# Patient Record
Sex: MALE | Race: WHITE | NOT HISPANIC OR LATINO | Employment: OTHER | URBAN - NONMETROPOLITAN AREA
[De-identification: names, ages, dates, MRNs, and addresses within clinical notes are randomized per-mention and may not be internally consistent; named-entity substitution may affect disease eponyms.]

---

## 2017-02-10 ENCOUNTER — HOSPITAL ENCOUNTER (INPATIENT)
Facility: HOSPITAL | Age: 77
LOS: 13 days | Discharge: HOME | End: 2017-02-23
Attending: PSYCHIATRY & NEUROLOGY | Admitting: PSYCHIATRY & NEUROLOGY

## 2017-02-10 PROBLEM — F03.918 DEMENTIA WITH BEHAVIORAL DISTURBANCE (HCC): Status: ACTIVE | Noted: 2017-02-10

## 2017-02-10 RX ORDER — IBUPROFEN 600 MG/1
600 TABLET ORAL 4 TIMES DAILY
COMMUNITY
End: 2017-03-09 | Stop reason: HOSPADM

## 2017-02-10 RX ORDER — DONEPEZIL HYDROCHLORIDE 5 MG/1
5 TABLET, FILM COATED ORAL NIGHTLY
COMMUNITY
End: 2017-02-23 | Stop reason: HOSPADM

## 2017-02-10 RX ORDER — HYDROXYZINE PAMOATE 50 MG/1
50 CAPSULE ORAL EVERY 6 HOURS PRN
Status: DISCONTINUED | OUTPATIENT
Start: 2017-02-10 | End: 2017-02-23 | Stop reason: HOSPADM

## 2017-02-10 RX ORDER — DONEPEZIL HYDROCHLORIDE 5 MG/1
5 TABLET, FILM COATED ORAL NIGHTLY
Status: DISCONTINUED | OUTPATIENT
Start: 2017-02-11 | End: 2017-02-19

## 2017-02-10 RX ORDER — MEMANTINE HYDROCHLORIDE 5 MG/1
5 TABLET ORAL 2 TIMES DAILY
Status: ON HOLD | COMMUNITY
End: 2017-03-09

## 2017-02-10 RX ORDER — TRAZODONE HYDROCHLORIDE 50 MG/1
50 TABLET ORAL NIGHTLY PRN
Status: DISCONTINUED | OUTPATIENT
Start: 2017-02-10 | End: 2017-02-19

## 2017-02-10 RX ORDER — LOPERAMIDE HYDROCHLORIDE 2 MG/1
2 CAPSULE ORAL 4 TIMES DAILY PRN
Status: DISCONTINUED | OUTPATIENT
Start: 2017-02-10 | End: 2017-02-23 | Stop reason: HOSPADM

## 2017-02-10 RX ORDER — DOCUSATE SODIUM 100 MG/1
100 CAPSULE, LIQUID FILLED ORAL 2 TIMES DAILY PRN
Status: DISCONTINUED | OUTPATIENT
Start: 2017-02-10 | End: 2017-02-23 | Stop reason: HOSPADM

## 2017-02-10 RX ORDER — MEMANTINE HYDROCHLORIDE 5 MG/1
5 TABLET ORAL 2 TIMES DAILY
Status: DISCONTINUED | OUTPATIENT
Start: 2017-02-11 | End: 2017-02-23 | Stop reason: HOSPADM

## 2017-02-10 RX ORDER — MAGNESIUM HYDROXIDE/ALUMINUM HYDROXICE/SIMETHICONE 120; 1200; 1200 MG/30ML; MG/30ML; MG/30ML
30 SUSPENSION ORAL EVERY 6 HOURS PRN
Status: DISCONTINUED | OUTPATIENT
Start: 2017-02-10 | End: 2017-02-23 | Stop reason: HOSPADM

## 2017-02-10 RX ORDER — ACETAMINOPHEN 325 MG/1
650 TABLET ORAL EVERY 4 HOURS PRN
Status: DISCONTINUED | OUTPATIENT
Start: 2017-02-10 | End: 2017-02-23 | Stop reason: HOSPADM

## 2017-02-10 RX ORDER — CLONIDINE HYDROCHLORIDE 0.1 MG/1
0.1 TABLET ORAL EVERY 4 HOURS PRN
Status: DISCONTINUED | OUTPATIENT
Start: 2017-02-10 | End: 2017-02-23 | Stop reason: HOSPADM

## 2017-02-10 RX ORDER — TRAZODONE HYDROCHLORIDE 50 MG/1
25 TABLET ORAL NIGHTLY
COMMUNITY
End: 2017-02-23 | Stop reason: HOSPADM

## 2017-02-11 PROBLEM — F06.34 BIPOLAR AND RELATED DISORDER DUE TO ANOTHER MEDICAL CONDITION WITH MIXED FEATURES: Status: ACTIVE | Noted: 2017-02-11

## 2017-02-11 PROBLEM — F03.91 MAJOR NEUROCOGNITIVE DISORDER DUE TO ALZHEIMER'S DISEASE, PROBABLE, WITH BEHAVIORAL DISTURBANCE: Status: ACTIVE | Noted: 2017-02-10

## 2017-02-11 PROCEDURE — 90791 PSYCH DIAGNOSTIC EVALUATION: CPT | Performed by: PSYCHIATRY & NEUROLOGY

## 2017-02-11 PROCEDURE — 99221 1ST HOSP IP/OBS SF/LOW 40: CPT | Performed by: FAMILY MEDICINE

## 2017-02-11 RX ORDER — NICOTINE 21 MG/24HR
1 PATCH, TRANSDERMAL 24 HOURS TRANSDERMAL EVERY 24 HOURS
Status: DISCONTINUED | OUTPATIENT
Start: 2017-02-11 | End: 2017-02-23 | Stop reason: HOSPADM

## 2017-02-11 RX ORDER — DIVALPROEX SODIUM 500 MG/1
500 TABLET, EXTENDED RELEASE ORAL NIGHTLY
Status: DISCONTINUED | OUTPATIENT
Start: 2017-02-11 | End: 2017-02-12

## 2017-02-11 RX ADMIN — TRAZODONE HYDROCHLORIDE 50 MG: 50 TABLET ORAL at 21:17

## 2017-02-11 RX ADMIN — NICOTINE 1 PATCH: 14 PATCH, EXTENDED RELEASE TRANSDERMAL at 11:28

## 2017-02-11 RX ADMIN — DIVALPROEX SODIUM 500 MG: 500 TABLET, FILM COATED, EXTENDED RELEASE ORAL at 21:17

## 2017-02-11 RX ADMIN — DONEPEZIL HYDROCHLORIDE 5 MG: 5 TABLET, FILM COATED ORAL at 21:17

## 2017-02-11 RX ADMIN — MEMANTINE 5 MG: 5 TABLET ORAL at 17:32

## 2017-02-11 RX ADMIN — MEMANTINE 5 MG: 5 TABLET ORAL at 09:28

## 2017-02-11 NOTE — PLAN OF CARE
Problem: BH Overarching Goals  Goal: Adheres to Safety Considerations for Self and Others  Intervention: Develop/maintain Individualized Safety Plan    02/11/17 0018   Safety   Safety Measures belongings check completed;clinical history reviewed;environmental rounds completed;safety rounds completed;suicide assessment completed   Provide Emotional/Physical Safety   Suicidal Ideation no

## 2017-02-11 NOTE — NURSING NOTE
Behavior   Anxiety 0  Depression 0  Pain 0  AVH no  S/I no  H/I no     Pt up sitting in wheelchair in sarita dayroom area, pt quiet, watching TV. Pt unable to rate anxiety, depression or participate much in assessment related to confusion. Pt talks loud and is gruff with staff when asking questions. Pt was cooperative with taking morning medications. Pt is alert to self only.           Intervention  Instructed in med usage and effects, encouraged to verbalize needs. Meds administered as ordered.  Attempts made to reorient pt as needed.        Response  Verbalized understanding but is very forgetful and confused.           Plan  Continue to monitor for safety/  every 15 minute monitoring checks.Staff to be in sarita dayroom area to assist as needed.

## 2017-02-11 NOTE — NURSING NOTE
"Behavior   Anxiety 0  Depression 0  Pain 0  AVH no  S/I no  H/I no    Pt has been up in [facility] dayroom area, except for napping. Pt is confused, alert to self only, pt could not recall name of his wife or if he was  when told his family was coming for a visit. Pt yells ,\"hey somebody\" when he needs help but is easily calmed and redirected. Pt allows staff to assist him with personal hygiene and ADLs. Pt is compliant with scheduled medications.        Intervention  Instructed in med usage and effects, encouraged to verbalize needs. Meds administered as ordered.          Response  Pt confused, reoriented as needed. Pt easy to redirect. No aggression noted.          Plan  Continue to monitor for safety/  every 15 minute monitoring checks.    "

## 2017-02-11 NOTE — NURSING NOTE
Dr Villeda ROS         General  NONE    Eyes   glasses/contact lens    ENT/Mouth   None    Cardio   None    Resp   Coughing    GI    None       None    MS    Stiffness    Skin/Hair/Nails   None    Neuro   Numbness or tingling left hand

## 2017-02-11 NOTE — CONSULTS
"Adult Nutrition  Assessment    Patient Name:  Naresh Saunders  YOB: 1940  MRN: [id number]  Admit Date:  2/10/2017    Assessment Date:  2/11/2017          Reason for Assessment       02/11/17 1224    Reason for Assessment    Reason For Assessment/Visit admission assessment    Identified At Risk By Screening Criteria MST SCORE 2+                    Labs/Tests/Procedures/Meds       02/11/17 1225    Labs/Tests/Procedures/Meds    Medication Review Reviewed, pertinent              Estimated/Assessed Needs       02/11/17 1225    Calculation Measurements    Weight Used For Calculations 70 kg (154 lb 5.2 oz)    Height Used for Calculations 1.727 m (5' 8\")    Estimated/Assessed Energy Needs    Energy Need Method Kcal/kg    kcal/kg 25    25 Kcal/Kg (kcal) 1750    Estimated/Assessed Protein Needs    Weight Used for Protein Calculation 70 kg (154 lb 5.2 oz)    Protein (gm/kg) 0.8    0.8 Gm Protein (gm) 56            Nutrition Prescription Ordered       02/11/17 1226    Nutrition Prescription PO    Current PO Diet Regular            Evaluation of Received Nutrient/Fluid Intake       02/11/17 1226    PO Evaluation    Number of Days PO Intake Evaluated 1 day    Number of Meals 1    % PO Intake 50            Comments:  RD consult for MST score 4. RN indicates pt is eating well and will probably take a supplement. Unsure of wt hx. RD will add supplement and monitor acceptance.         Electronically signed by:  Dottie Loving RD  02/11/17 12:31 PM  "
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
## 2017-02-11 NOTE — CONSULTS
CHIEF COMPLAINT/REASON FOR VISIT:  Suicidal Ideation and Agitation    HPI:  Patient presented to [facility] ED with the above complaint on February 10th at 11:50 AM.  He was brought in by family because he was threatening to harm himself and some family members.  When he got to the emergency room he denied this.    PROBLEM LIST:  Patient Active Problem List    Diagnosis   • Dementia with behavioral disturbance [F03.91]         CURRENT MEDICATIONS:  Prescriptions Prior to Admission   Medication Sig Dispense Refill Last Dose   • donepezil (ARICEPT) 5 MG tablet Take 5 mg by mouth Every Night.      • ibuprofen (ADVIL,MOTRIN) 600 MG tablet Take 600 mg by mouth 4 (Four) Times a Day.      • memantine (NAMENDA) 5 MG tablet Take 5 mg by mouth 2 (Two) Times a Day.      • traZODone (DESYREL) 50 MG tablet Take 25 mg by mouth Every Night.          ALLERGIES:  Penicillins      PAST MEDICAL/SURGICAL HISTORY:  History reviewed. No pertinent past medical history.    Past Surgical History   Procedure Laterality Date   • Tonsillectomy     • Adenoidectomy Bilateral        Review of Systems   Constitutional: Negative for activity change, appetite change, fatigue and fever.        The patient does only respond yes to 1 system, but it is difficult for him to attend to the questions.   HENT: Negative for congestion, ear discharge, ear pain, facial swelling, hearing loss, nosebleeds, postnasal drip, rhinorrhea, sinus pressure, sore throat, tinnitus and trouble swallowing.    Eyes: Negative for pain, discharge and visual disturbance.   Respiratory: Negative for cough, shortness of breath and wheezing.    Cardiovascular: Negative for chest pain, palpitations and leg swelling.   Gastrointestinal: Negative for abdominal pain, blood in stool, constipation, diarrhea, nausea and vomiting.   Genitourinary: Negative for difficulty urinating, discharge, dysuria, flank pain, frequency, hematuria, penile pain, penile swelling, scrotal swelling,  "testicular pain and urgency.   Musculoskeletal: Negative for arthralgias, back pain, joint swelling, myalgias and neck pain.   Skin: Negative for rash and wound.   Neurological: Positive for numbness. Negative for dizziness, seizures, syncope, weakness, light-headedness and headaches.        Patient reports intermittent numbness and tingling of the left hand.   Hematological: Negative for adenopathy.       Social History     Social History   • Marital status:      Spouse name: N/A   • Number of children: N/A   • Years of education: N/A     Occupational History   • Not on file.     Social History Main Topics   • Smoking status: Heavy Tobacco Smoker     Packs/day: 1.50     Years: 50.00   • Smokeless tobacco: Not on file      Comment: patient has been smoking for more than 50 years; states 1 or more per day   • Alcohol use 1.8 oz/week     3 Cans of beer per week      Comment: 3-4 times per week   • Drug use: No   • Sexual activity: Yes     Partners: Female      Comment: \"lady friend\" Italia Flores     Other Topics Concern   • Not on file     Social History Narrative       History reviewed. No pertinent family history.          Objective     Visit Vitals   • /70 (BP Location: Left arm, Patient Position: Sitting)   • Pulse 67   • Temp 97.4 °F (36.3 °C) (Oral)   • Resp 20   • Ht 68\" (172.7 cm)   • Wt 134 lb 14.7 oz (61.2 kg)   • SpO2 95%   • BMI 20.51 kg/m2       Physical Exam   Constitutional: He appears well-developed and well-nourished.   HENT:   Head: Normocephalic and atraumatic.   Eyes: Conjunctivae and EOM are normal.   Neck: Normal range of motion. Neck supple. No thyromegaly present.   Cardiovascular: Normal rate, regular rhythm and normal heart sounds.  Exam reveals no gallop and no friction rub.    No murmur heard.  Pulmonary/Chest: Effort normal and breath sounds normal. No respiratory distress. He has no wheezes. He has no rales.   Abdominal: Soft. He exhibits no distension and no mass. There is " no tenderness. There is no rebound and no guarding.   Musculoskeletal: Normal range of motion.   Lymphadenopathy:     He has no cervical adenopathy.   Neurological: He is alert. He has normal strength. He displays no tremor and normal reflexes. A cranial nerve deficit is present. No sensory deficit. He exhibits normal muscle tone. Coordination normal. He displays no Babinski's sign on the right side. He displays no Babinski's sign on the left side.   Reflex Scores:       Tricep reflexes are 3+ on the right side and 3+ on the left side.       Bicep reflexes are 3+ on the right side and 3+ on the left side.       Brachioradialis reflexes are 3+ on the right side and 3+ on the left side.       Patellar reflexes are 3+ on the right side and 3+ on the left side.       Achilles reflexes are 3+ on the right side and 3+ on the left side.  Patient is unable to identify an alcohol prep by smell.  All other cranial nerves are intact, although cooperation makes detailed testing difficult.   Skin: Skin is warm and dry. No rash noted. No erythema.   Nursing note and vitals reviewed.      Dystonia/Tardive Dyskinesia  Absent  Meningeal Signs  Absent    Diagnostic Studies  CBC, CMP,TSH, UDS, acetaminophen level, salicylate level, ethanol level, U/A all normal except     MCHC is slightly low at 33 but H&H is normal at 14.6 over 44.2.  Creatinine is 1.08, sodium slightly low at 132 and alkaline phosphatase slightly elevated at 155.  Ethanol is less than 10 troponin is less than 0.012, urine drug screen is all negative and urinalysis is unremarkable.    Non-contrasted CT of the head shows moderate cortical atrophy with ventricular dilatation without acute change.  Chest x-ray shows mild interstitial prominence in the mid and lower lungs bilaterally.  There is no focal infiltrate.    Assessment/Plan     Patient Active Problem List    Diagnosis   • Dementia with behavioral disturbance [F03.91]     Mild hyponatremia. plan will be to  encourage normal diet and fluid intake. this is unlikely to explain his behavior.    Interstitial prominence on the chest x-ray is unlikely to be pneumonia given the rest of the clinical presentation.  Again this is unlikely to contribute to his current behavioral issues.  Will monitor during this hospitalization.      Continue Home Meds as ordered. Mental health and pain issues managed per psychiatry.  Further diagnostic studies or intervention based on hospital course.
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
## 2017-02-11 NOTE — PLAN OF CARE
Problem: BH Patient Care Overview (Adult)  Goal: Plan of Care Review  Outcome: Ongoing (interventions implemented as appropriate)  Goal: Interdisciplinary Rounds/Family Conference  Outcome: Ongoing (interventions implemented as appropriate)  Goal: Individualization and Mutuality  Outcome: Ongoing (interventions implemented as appropriate)  Goal: Discharge Needs Assessment  Outcome: Ongoing (interventions implemented as appropriate)    Problem:  Overarching Goals  Goal: Adheres to Safety Considerations for Self and Others  Outcome: Ongoing (interventions implemented as appropriate)  Goal: Optimized Coping Skills in Response to Life Stressors  Outcome: Ongoing (interventions implemented as appropriate)  Goal: Develops/Participates in Therapeutic [place] to Support Successful Transition  Outcome: Ongoing (interventions implemented as appropriate)

## 2017-02-12 PROCEDURE — 99232 SBSQ HOSP IP/OBS MODERATE 35: CPT | Performed by: PSYCHIATRY & NEUROLOGY

## 2017-02-12 RX ADMIN — TRAZODONE HYDROCHLORIDE 50 MG: 50 TABLET ORAL at 21:12

## 2017-02-12 RX ADMIN — MEMANTINE 5 MG: 5 TABLET ORAL at 17:42

## 2017-02-12 RX ADMIN — NICOTINE 1 PATCH: 14 PATCH, EXTENDED RELEASE TRANSDERMAL at 08:02

## 2017-02-12 RX ADMIN — DIVALPROEX SODIUM 750 MG: 500 TABLET, FILM COATED, EXTENDED RELEASE ORAL at 21:12

## 2017-02-12 RX ADMIN — DONEPEZIL HYDROCHLORIDE 5 MG: 5 TABLET, FILM COATED ORAL at 21:12

## 2017-02-12 RX ADMIN — HYDROXYZINE PAMOATE 50 MG: 50 CAPSULE ORAL at 21:12

## 2017-02-12 RX ADMIN — MEMANTINE 5 MG: 5 TABLET ORAL at 08:01

## 2017-02-12 NOTE — NURSING NOTE
Behavior   Anxiety 0  Depression 0  Pain 0  AVH no  S/I no  H/I no     Pt. In common area upon assessment and medication pass.  Pt is disoriented but pleasant; is alert to person.  Pt interacts with staff and others appropriately.  No changes from last assessment.    Intervention  Medications administered and assessment complete    Response  Pt took medications and had snack    Plan  Will promote and reinforce current treatment plan and encourage involvement in care plan goals. Will provide for safe, calm, quiet environment. Will promote open communication with staff and foster a trusting/working relationship with patient. Will promote participation in groups and therapies and independent reflection.

## 2017-02-12 NOTE — PLAN OF CARE
Problem: BH Patient Care Overview (Adult)  Goal: Plan of Care Review  Outcome: Ongoing (interventions implemented as appropriate)  Goal: Interdisciplinary Rounds/Family Conference  Outcome: Ongoing (interventions implemented as appropriate)  Goal: Individualization and Mutuality  Outcome: Ongoing (interventions implemented as appropriate)  Goal: Discharge Needs Assessment  Outcome: Ongoing (interventions implemented as appropriate)    Problem:  Overarching Goals  Goal: Adheres to Safety Considerations for Self and Others  Outcome: Ongoing (interventions implemented as appropriate)  Goal: Optimized Coping Skills in Response to Life Stressors  Outcome: Ongoing (interventions implemented as appropriate)  Goal: Develops/Participates in Therapeutic [place] to Support Successful Transition  Outcome: Ongoing (interventions implemented as appropriate)

## 2017-02-12 NOTE — PROGRESS NOTES
2/12/2017    Chief Complaint: dementia related behavioral issues    Subjective:  Patient is a 76 y.o. male who was hospitalized for dementia related behavioral issues.   Since yesterday the patient has been confused but pleasant and cooperative.  He had very choppy interrupted sleep.  Patient's medications are tolerable and effective.    Objective     Vital Signs    Temp:  [97.1 °F (36.2 °C)-97.2 °F (36.2 °C)] 97.1 °F (36.2 °C)  Heart Rate:  [69-70] 70  Resp:  [18-20] 18  BP: (130-133)/(65-69) 133/69    Physical Exam:   General Appearance: alert, appears stated age and cooperative,  Hygiene:   fair  Gait & Station: Normal  Musculoskeletal: No tremors or abnormal involuntary movements}    Mental Status Exam:   Cooperation:  Cooperative  Eye Contact:  Fair  Psychomotor Behavior:  Restless  Mood: confused  Affect:  mood-congruent  Speech:  Normal  Thought Process:  Poverty of thought and [place]  Associations: Goal Directed  Thought Content:     Normal   Suicidal:  None   Homicidal:  None   Hallucinations:  None   Delusion:  None  Cognitive Functioning:   Consciousness: awake and alert and Orientation:  Person  Reliability:  poor  Insight:  Poor  Judgement:  Poor  Impulse Control:  Poor    Lab Results (last 24 hours)     Procedure Component Value Units Date/Time    SCANNED - LABS [[id number]] Resulted:  02/10/17      Updated:  02/12/17 0625        Imaging Results (last 24 hours)     Procedure Component Value Units Date/Time    SCANNED - IMAGING [[id number]] Resulted:  02/10/17      Updated:  02/12/17 0614    SCANNED - IMAGING [[id number]] Resulted:  02/10/17      Updated:  02/12/17 0614          Medicine:   Current Facility-Administered Medications:   •  acetaminophen (TYLENOL) tablet 650 mg, 650 mg, Oral, Q4H PRN, Fahad Camara MD  •  aluminum-magnesium hydroxide-simethicone (MAALOX/MYLANTA) suspension 30 mL, 30 mL, Oral, Q6H PRN, Fahad Camara MD  •  CloNIDine (CATAPRES) tablet 0.1 mg, 0.1 mg, Oral, Q4H PRN,  Fahad Camara MD  •  divalproex (DEPAKOTE) 24 hr tablet 500 mg, 500 mg, Oral, Nightly, Fahad Camara MD, 500 mg at 02/11/17 2117  •  docusate sodium (COLACE) capsule 100 mg, 100 mg, Oral, BID PRN, Fahad Camara MD  •  donepezil (ARICEPT) tablet 5 mg, 5 mg, Oral, Nightly, Fahad Camara MD, 5 mg at 02/11/17 2117  •  hydrOXYzine (VISTARIL) capsule 50 mg, 50 mg, Oral, Q6H PRN, Fahad Camara MD  •  loperamide (IMODIUM) capsule 2 mg, 2 mg, Oral, 4x Daily PRN, Fahad Camara MD  •  magnesium hydroxide (MILK OF MAGNESIA) suspension 2400 mg/10mL 10 mL, 10 mL, Oral, Daily PRN, Fahad Camara MD  •  memantine (NAMENDA) tablet 5 mg, 5 mg, Oral, BID, Fahad Camara MD, 5 mg at 02/12/17 0801  •  nicotine (NICODERM CQ) 14 MG/24HR patch 1 patch, 1 patch, Transdermal, Q24H, Fahad Camara MD, 1 patch at 02/12/17 0802  •  traZODone (DESYREL) tablet 50 mg, 50 mg, Oral, Nightly PRN, Fahad Camara MD, 50 mg at 02/11/17 2117    Diagnoses/Assessment:   Active Problems:    Major neurocognitive disorder due to Alzheimer's disease, probable, with behavioral disturbance    Bipolar and related disorder due to another medical condition with mixed features      Treatment Plan:    1) Will continue care for the patient on the behavioral health unit at [facility] to ensure patient safety.  2) Will continue to provide treatment with the unit milieu, activities, therapies and psychopharmacological management.  3) Patient to be placed on or continued on  Q15 minute checks  and Elopement, Aggression and Fall precautions.  4) Will continue medical management by Dr. Villeda.  5) Will order following labs: none  6) Will make the following medication changes: Increase the depakote to 750mg qhs.  Cont the aricept and namenda  7) Will continue discharge planning as appropriate for patient.  8) Psychotherapy provided: none      Fahad Camara MD  02/12/17  2:31 PM

## 2017-02-12 NOTE — NURSING NOTE
Behavior   Anxiety 0  Depression 0  Pain 0  AVH no  S/I no  H/I no     Pt unable to rate anxiety, depression or participate fully in assessment related to confusion. Pt has to be redirected and reoriented at times but remains calm and pleasant.            Intervention  Instructed in med usage and effects, encouraged to verbalize needs. Meds administered as ordered.          Response  Verbalized understanding           Plan  Continue to monitor for safety/  every 15 minute monitoring checks.

## 2017-02-13 PROCEDURE — 99232 SBSQ HOSP IP/OBS MODERATE 35: CPT | Performed by: NURSE PRACTITIONER

## 2017-02-13 RX ADMIN — DIVALPROEX SODIUM 750 MG: 500 TABLET, FILM COATED, EXTENDED RELEASE ORAL at 20:38

## 2017-02-13 RX ADMIN — MEMANTINE 5 MG: 5 TABLET ORAL at 17:03

## 2017-02-13 RX ADMIN — DONEPEZIL HYDROCHLORIDE 5 MG: 5 TABLET, FILM COATED ORAL at 20:38

## 2017-02-13 RX ADMIN — MEMANTINE 5 MG: 5 TABLET ORAL at 08:11
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
## 2017-02-13 NOTE — PLAN OF CARE
Problem: BH Patient Care Overview (Adult)  Goal: Plan of Care Review  Outcome: Ongoing (interventions implemented as appropriate)  Goal: Interdisciplinary Rounds/Family Conference  Outcome: Ongoing (interventions implemented as appropriate)    02/13/17 0999   Interdisciplinary Rounds/Family Conf   Participants advanced practice nurse;therapeutic recreation;social work;nursing;other (see comments)       Goal: Individualization and Mutuality  Outcome: Ongoing (interventions implemented as appropriate)  Goal: Discharge Needs Assessment  Outcome: Ongoing (interventions implemented as appropriate)

## 2017-02-13 NOTE — NURSING NOTE
Behavior   Anxiety 0  Depression 0  Pain 0  AVH no  S/I no   H/I no            Intervention  Instructed in med usage and effects, encouraged to verbalize needs. Meds administered as ordered. Monitored throughout shift          Response  Verbalized understanding           Plan  Continue to monitor for safety/  every 15 minute monitoring checks. Assist to meet treatment goals.

## 2017-02-13 NOTE — PLAN OF CARE
Problem: BH Patient Care Overview (Adult)  Goal: Plan of Care Review  Outcome: Ongoing (interventions implemented as appropriate)  Goal: Interdisciplinary Rounds/Family Conference  Outcome: Ongoing (interventions implemented as appropriate)  Goal: Individualization and Mutuality  Outcome: Ongoing (interventions implemented as appropriate)  Goal: Discharge Needs Assessment  Outcome: Ongoing (interventions implemented as appropriate)    Problem:  Overarching Goals  Goal: Adheres to Safety Considerations for Self and Others  Outcome: Ongoing (interventions implemented as appropriate)  Goal: Optimized Coping Skills in Response to Life Stressors  Outcome: Ongoing (interventions implemented as appropriate)  Goal: Develops/Participates in Therapeutic [place] to Support Successful Transition  Outcome: Ongoing (interventions implemented as appropriate)

## 2017-02-13 NOTE — PLAN OF CARE
Problem: BH Patient Care Overview (Adult)  Goal: Plan of Care Review  Outcome: Ongoing (interventions implemented as appropriate)  Goal: Interdisciplinary Rounds/Family Conference  Outcome: Ongoing (interventions implemented as appropriate)  Goal: Individualization and Mutuality  Outcome: Ongoing (interventions implemented as appropriate)  Goal: Discharge Needs Assessment  Outcome: Ongoing (interventions implemented as appropriate)    Problem:  Overarching Goals  Goal: Adheres to Safety Considerations for Self and Others  Outcome: Ongoing (interventions implemented as appropriate)  Goal: Optimized Coping Skills in Response to Life Stressors  Outcome: Ongoing (interventions implemented as appropriate)  Goal: Develops/Participates in Therapeutic [place] to Support Successful Transition  Outcome: Ongoing (interventions implemented as appropriate)

## 2017-02-13 NOTE — PLAN OF CARE
Problem: BH Patient Care Overview (Adult)  Goal: Individualization and Mutuality  Outcome: Ongoing (interventions implemented as appropriate)  Goal: Discharge Needs Assessment  Outcome: Ongoing (interventions implemented as appropriate)    Problem:  Overarching Goals  Goal: Adheres to Safety Considerations for Self and Others  Outcome: Ongoing (interventions implemented as appropriate)  Goal: Optimized Coping Skills in Response to Life Stressors  Outcome: Ongoing (interventions implemented as appropriate)  Goal: Develops/Participates in Therapeutic [place] to Support Successful Transition  Outcome: Ongoing (interventions implemented as appropriate)
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
## 2017-02-13 NOTE — NURSING NOTE
Behavior   Anxiety 0  Depression 0  Pain 0  AVH no  S/I no  H/I no     Pt condition is unchanged from last shift.  Pt is pleasant and cooperative with staff.  Pt is redirected easily but not reoriented.      Intervention  Medications administered and assessment complete    Response  Patient took medications and had snack.    Plan  Will provide for safety and redirect/reorient patient as needed.

## 2017-02-13 NOTE — PLAN OF CARE
Problem: BH Patient Care Overview (Adult)  Goal: Plan of Care Review  Outcome: Ongoing (interventions implemented as appropriate)  Goal: Interdisciplinary Rounds/Family Conference  Outcome: Ongoing (interventions implemented as appropriate)  Goal: Individualization and Mutuality  Outcome: Ongoing (interventions implemented as appropriate)  Goal: Discharge Needs Assessment  Outcome: Ongoing (interventions implemented as appropriate)    Problem:  Overarching Goals  Goal: Adheres to Safety Considerations for Self and Others  Outcome: Ongoing (interventions implemented as appropriate)  Goal: Optimized Coping Skills in Response to Life Stressors  Outcome: Ongoing (interventions implemented as appropriate)  Goal: Develops/Participates in Therapeutic [place] to Support Successful Transition  Outcome: Ongoing (interventions implemented as appropriate)

## 2017-02-14 PROCEDURE — 99232 SBSQ HOSP IP/OBS MODERATE 35: CPT | Performed by: NURSE PRACTITIONER

## 2017-02-14 RX ADMIN — HYDROXYZINE PAMOATE 50 MG: 50 CAPSULE ORAL at 13:08

## 2017-02-14 RX ADMIN — TRAZODONE HYDROCHLORIDE 50 MG: 50 TABLET ORAL at 20:20

## 2017-02-14 RX ADMIN — DONEPEZIL HYDROCHLORIDE 5 MG: 5 TABLET, FILM COATED ORAL at 20:20

## 2017-02-14 RX ADMIN — DIVALPROEX SODIUM 750 MG: 500 TABLET, FILM COATED, EXTENDED RELEASE ORAL at 20:20

## 2017-02-14 RX ADMIN — MEMANTINE 5 MG: 5 TABLET ORAL at 18:07

## 2017-02-14 RX ADMIN — MEMANTINE 5 MG: 5 TABLET ORAL at 08:09

## 2017-02-14 NOTE — PLAN OF CARE
Problem: BH Patient Care Overview (Adult)  Goal: Plan of Care Review  Outcome: Ongoing (interventions implemented as appropriate)  Goal: Interdisciplinary Rounds/Family Conference  Outcome: Ongoing (interventions implemented as appropriate)  Goal: Individualization and Mutuality  Outcome: Ongoing (interventions implemented as appropriate)  Goal: Discharge Needs Assessment  Outcome: Ongoing (interventions implemented as appropriate)    Problem: BH Overarching Goals  Goal: Adheres to Safety Considerations for Self and Others  Outcome: Ongoing (interventions implemented as appropriate)  Goal: Optimized Coping Skills in Response to Life Stressors  Outcome: Ongoing (interventions implemented as appropriate)  Goal: Develops/Participates in Therapeutic [place] to Support Successful Transition  Outcome: Ongoing (interventions implemented as appropriate)    Problem: Risk for Self Injury/Neglect  Goal: Treatment Goal: Remain safe during length of stay, learn and adopt new coping skills, and be free of self-injurious ideation, impulses and acts at the time of discharge  Outcome: Ongoing (interventions implemented as appropriate)  Goal: Verbalize thoughts and feelings associated with:  Outcome: Ongoing (interventions implemented as appropriate)  Goal: Refrain from harming self  Outcome: Ongoing (interventions implemented as appropriate)  Goal: Attend and participate in unit activities, including therapeutic, recreational, and educational groups  Outcome: Ongoing (interventions implemented as appropriate)  Goal: Recognize maladaptive responses and adopt new coping mechanisms  Outcome: Ongoing (interventions implemented as appropriate)  Goal: Complete daily ADLs, including personal hygiene independently, as able  Outcome: Ongoing (interventions implemented as appropriate)    Problem: Fall Risk (Adult)  Goal: Identify Related Risk Factors and Signs and Symptoms  Outcome: Ongoing (interventions implemented as appropriate)  Goal:  Absence of Falls  Outcome: Ongoing (interventions implemented as appropriate)

## 2017-02-14 NOTE — PROGRESS NOTES
2/14/2017    Chief Complaint: suicidal ideation, dementia related behavioral issues, physical aggression and psychosis    Subjective:  Patient is a 76 y.o. male who was hospitalized for dementia related behavioral issues.   Since yesterday the patient has been improving. Less irritable and more talkative. Oriented to self only, but less irritable with the questions about orientation.  Patient reports medications are effective.  Further history reported: Improved overall. Cooperates with staff, taking directives from staff. Attempts to answer question.    Objective     Vital Signs    Temp:  [96.9 °F (36.1 °C)-97.6 °F (36.4 °C)] 97.6 °F (36.4 °C)  Heart Rate:  [67-77] 77  Resp:  [18] 18  BP: (128-133)/(60-71) 128/71    Physical Exam:   General Appearance: alert, pleasant, appears stated age and hard of hearing,  Hygiene:   good  Gait & Station: Needs Assistance  Musculoskeletal: No tremors or abnormal involuntary movements}    Mental Status Exam:   Cooperation:  Cooperative  Eye Contact:  Good  Psychomotor Behavior:  Slow  Mood: Euthymic  Affect:  blunted  Speech:  Monotone  Thought Process:  Poverty of thought  Associations: Loose Associations  Thought Content:     disoriented and confused, poor historian   Suicidal:  None   Homicidal:  None   Hallucinations:  None   Delusion:  None  Cognitive Functioning:   Consciousness: awake and alert  Reliability:  poor  Insight:  Poor  Judgement:  Fair  Impulse Control:  Fair    Lab Results (last 24 hours)     ** No results found for the last 24 hours. **        Imaging Results (last 24 hours)     ** No results found for the last 24 hours. **          Medicine:   Current Facility-Administered Medications:   •  acetaminophen (TYLENOL) tablet 650 mg, 650 mg, Oral, Q4H PRN, Fahad Camara MD  •  aluminum-magnesium hydroxide-simethicone (MAALOX/MYLANTA) suspension 30 mL, 30 mL, Oral, Q6H PRN, Fahad Camara MD  •  CloNIDine (CATAPRES) tablet 0.1 mg, 0.1 mg, Oral, Q4H  PRN, Fahad Camara MD  •  divalproex (DEPAKOTE) 24 hr tablet 750 mg, 750 mg, Oral, Nightly, Fahad Camara MD, 750 mg at 02/13/17 2038  •  docusate sodium (COLACE) capsule 100 mg, 100 mg, Oral, BID PRN, Fahad Camara MD  •  donepezil (ARICEPT) tablet 5 mg, 5 mg, Oral, Nightly, Fahad Camara MD, 5 mg at 02/13/17 2038  •  hydrOXYzine (VISTARIL) capsule 50 mg, 50 mg, Oral, Q6H PRN, Fahad Camara MD, 50 mg at 02/12/17 2112  •  loperamide (IMODIUM) capsule 2 mg, 2 mg, Oral, 4x Daily PRN, Fahad Camara MD  •  magnesium hydroxide (MILK OF MAGNESIA) suspension 2400 mg/10mL 10 mL, 10 mL, Oral, Daily PRN, Fahad Camara MD  •  memantine (NAMENDA) tablet 5 mg, 5 mg, Oral, BID, Fahad Camara MD, 5 mg at 02/14/17 0809  •  nicotine (NICODERM CQ) 14 MG/24HR patch 1 patch, 1 patch, Transdermal, Q24H, Fahad Camara MD, 1 patch at 02/12/17 0802  •  traZODone (DESYREL) tablet 50 mg, 50 mg, Oral, Nightly PRN, Fahad Camara MD, 50 mg at 02/12/17 2112    Diagnoses/Assessment:   Active Problems:    Major neurocognitive disorder due to Alzheimer's disease, probable, with behavioral disturbance    Bipolar and related disorder due to another medical condition with mixed features      Treatment Plan:    1) Will continue care for the patient on the behavioral health unit at [facility] to ensure patient safety.  2) Will continue to provide treatment with the unit milieu, activities, therapies and psychopharmacological management.  3) Patient to be placed on or continued on Q15 minute checks and Elopement, Aggression and Fall precautions.  4) Will continue medical management by Dr. Villeda's consult.  5) Will order following labs: no new labs.  6) Will make the following medication changes: none  7) Will continue discharge planning as appropriate for patient.    Treatment plan and medication risks and benefits discussed with: nursing staff and  in treatment team  meeting    Stephania Lira, APRN  02/14/17  10:42 AM

## 2017-02-14 NOTE — PLAN OF CARE
Problem: BH Patient Care Overview (Adult)  Goal: Plan of Care Review  Outcome: Ongoing (interventions implemented as appropriate)  Minimal participation in plan of care  Goal: Interdisciplinary Rounds/Family Conference  Outcome: Ongoing (interventions implemented as appropriate)  Goal: Individualization and Mutuality  Outcome: Ongoing (interventions implemented as appropriate)  Family called to inquire how the patient was doing, stated they would continue to follow up  Goal: Discharge Needs Assessment  Outcome: Ongoing (interventions implemented as appropriate)    Problem: BH Overarching Goals  Goal: Adheres to Safety Considerations for Self and Others  Outcome: Ongoing (interventions implemented as appropriate)  Pt wears non skid foot wear when up  Goal: Optimized Coping Skills in Response to Life Stressors  Outcome: Ongoing (interventions implemented as appropriate)  Pt with minimal participation with care plan  Goal: Develops/Participates in Therapeutic [place] to Support Successful Transition  Outcome: Ongoing (interventions implemented as appropriate)  Pt with minimal participation this shift with care plan    Problem: Fall Risk (Adult)  Goal: Identify Related Risk Factors and Signs and Symptoms  Outcome: Ongoing (interventions implemented as appropriate)  Pt agitated this shift, no falls  Goal: Absence of Falls  Outcome: Ongoing (interventions implemented as appropriate)  Pt without falls this shift

## 2017-02-14 NOTE — NURSING NOTE
"Behavior   Anxiety 0  Depression 0  Pain 0  AVH no  S/I no  H/I no            Intervention  Instructed in med usage and effects, encouraged to verbalize needs. Meds administered as ordered. Pt redirected for going into female pt's room and scaring her. Pt became angry, yelling at her, \"stick it up your ass\".           Response  Pt then became angry at signee for redirecting him. He has to be reminded frequently which room is his.           Plan  Continue to monitor for safety/  every 15 minute monitoring checks. Assist to meet treatment goals.   "

## 2017-02-14 NOTE — NURSING NOTE
Behavior   Pt noted to have flat affect. Alert, oriented to person. Ambulatory with no assistance.         Intervention  Monitored throughout shift.     Response  Pt continues to curse frequently and yell out at peers and staff. Required several prompts to not get in the shower with his clothes on.       Plan Will continue to monitor and assist to meet treatment goals.

## 2017-02-14 NOTE — NURSING NOTE
Behavior   Anxiety 0  Depression 0  Pain 0  AVH no  S/I no  H/I no    Pt standing in room, denies anxiety depression or pain.  Stated has some itching between toes, willing to to take meds as ordered.  Pt in shower yelling after writer left room.  Stated couldn't get water turned off, yelling help me help me.  Pt clothes changed and assisted back to bed.  Pt re directed after 5 minutes, fixated on shower/water.        Intervention  Instructed in med usage and effects, encouraged to verbalize needs. Meds administered as ordered. Redirected /assisted to bed          Response  Verbalized understanding /willing to be re directed          Plan  Continue to monitor for safety/  every 15 minute monitoring checks.

## 2017-02-15 PROCEDURE — 99232 SBSQ HOSP IP/OBS MODERATE 35: CPT | Performed by: NURSE PRACTITIONER

## 2017-02-15 RX ADMIN — NICOTINE 1 PATCH: 14 PATCH, EXTENDED RELEASE TRANSDERMAL at 10:21

## 2017-02-15 RX ADMIN — MEMANTINE 5 MG: 5 TABLET ORAL at 17:43

## 2017-02-15 RX ADMIN — MEMANTINE 5 MG: 5 TABLET ORAL at 08:23

## 2017-02-15 RX ADMIN — DONEPEZIL HYDROCHLORIDE 5 MG: 5 TABLET, FILM COATED ORAL at 20:36

## 2017-02-15 RX ADMIN — DIVALPROEX SODIUM 750 MG: 500 TABLET, FILM COATED, EXTENDED RELEASE ORAL at 20:36

## 2017-02-15 NOTE — PROGRESS NOTES
2/15/2017    Chief Complaint: suicidal ideation, dementia related behavioral issues, physical aggression and psychosis    Subjective:  Patient is a 76 y.o. male who was hospitalized for dementia related behavioral issues.   Since yesterday the patient has been stable.  He is oriented to self.  He tries to answer questions, but has poor recall.  He admits interest in watching Westerns and that is an enjoyable activity for him. He was to add 2+2, 4+4, 8+8, 16+16, and 32+32. He could not recall where here was. He states his memory has been poor. He's hair is messy today and does not seem aware of grooming. He is able to feed himself. Asks to wear his dentures. Asked to watch television. Did not know date, time, or president.    Objective       Vital Signs    Temp:  [97.3 °F (36.3 °C)-98 °F (36.7 °C)] 98 °F (36.7 °C)  Heart Rate:  [61-62] 62  Resp:  [18] 18  BP: (123-139)/(66-71) 123/66    Physical Exam:   General Appearance: alert, appears stated age and cooperative, hard of hearing.  Hygiene:   fair  Gait & Station: Normal and Needs Assistance  Musculoskeletal: No tremors noted.    Mental Status Exam:   Cooperation:  Cooperative  Eye Contact:   Fair  Psychomotor Behavior:  Appropriate  Mood: Euthymic  Affect:  flat  Speech:  Minimal   Thought Process:  Poverty of thought  Associations: Disorganized  Thought Content:     Unable to demonstrate   Suicidal:  None   Homicidal:  None   Hallucinations:  None and Not demonstrated today   Delusion:  None  Cognitive Functioning:   Orientation:  Person  Reliability:  poor  Insight:  Poor  Judgement:  Impaired  Impulse Control:  Poor    Diagnostic Studies  CBC, CMP,TSH, UDS, acetaminophen level, salicylate level, ethanol level, U/A all normal except      MCHC is slightly low at 33 but H&H is normal at 14.6 over 44.2. Creatinine is 1.08, sodium slightly low at 132 and alkaline phosphatase slightly elevated at 155. Ethanol is less than 10 troponin is less than 0.012 urine drug  screen is all negative and urinalysis is unremarkable.     Non-contrasted CT of the head shows moderate cortical atrophy with ventricular dilatation without acute change. Chest x-ray shows mild interstitial prominence in the mid and lower lungs bilaterally. There is no focal infiltrate.    Imaging Results (last 24 hours)     ** No results found for the last 24 hours. **          Medicine:   Current Facility-Administered Medications:   •  acetaminophen (TYLENOL) tablet 650 mg, 650 mg, Oral, Q4H PRN, Fahad Camara MD  •  aluminum-magnesium hydroxide-simethicone (MAALOX/MYLANTA) suspension 30 mL, 30 mL, Oral, Q6H PRN, Fahad Camara MD  •  CloNIDine (CATAPRES) tablet 0.1 mg, 0.1 mg, Oral, Q4H PRN, Fahad Camara MD  •  divalproex (DEPAKOTE) 24 hr tablet 750 mg, 750 mg, Oral, Nightly, Fahad Camara MD, 750 mg at 02/14/17 2020  •  docusate sodium (COLACE) capsule 100 mg, 100 mg, Oral, BID PRN, Fahad Camara MD  •  donepezil (ARICEPT) tablet 5 mg, 5 mg, Oral, Nightly, Fahad Camara MD, 5 mg at 02/14/17 2020  •  hydrOXYzine (VISTARIL) capsule 50 mg, 50 mg, Oral, Q6H PRN, Fahad Camara MD, 50 mg at 02/14/17 1308  •  loperamide (IMODIUM) capsule 2 mg, 2 mg, Oral, 4x Daily PRN, Fahad Camara MD  •  magnesium hydroxide (MILK OF MAGNESIA) suspension 2400 mg/10mL 10 mL, 10 mL, Oral, Daily PRN, Fahad Camara MD  •  memantine (NAMENDA) tablet 5 mg, 5 mg, Oral, BID, Fahad Camara MD, 5 mg at 02/15/17 0823  •  nicotine (NICODERM CQ) 14 MG/24HR patch 1 patch, 1 patch, Transdermal, Q24H, Fahad Camara MD, 1 patch at 02/15/17 1021  •  traZODone (DESYREL) tablet 50 mg, 50 mg, Oral, Nightly PRN, Fahad Camara MD, 50 mg at 02/14/17 2020    Diagnoses/Assessment:   Active Problems:    Major neurocognitive disorder due to Alzheimer's disease, probable, with behavioral disturbance    Bipolar and related disorder due to another medical condition with mixed features      Treatment  Plan:    1) Will continue care for the patient on the behavioral health unit at [facility] to ensure patient safety.  2) Will continue to provide treatment with the unit milieu, activities, therapies and psychopharmacological management.  3) Patient to be placed on or continued on Q15 minute checks and Elopement, Aggression and Fall precautions.  4) Will continue medical management by Dr. Villeda's consult.  5) Will order following labs: Depakote level in the AM  6) Will make the following medication changes: none-will check depakote level and adjust based on outcome of level.  7) Will continue discharge planning as appropriate for patient.    Treatment plan and medication risks and benefits discussed with: Patient    Stephania Cathy Lira, APRN  02/15/17  10:33 AM

## 2017-02-15 NOTE — NURSING NOTE
Behavior   Anxiety 0  Depression 0  Pain 0  AVH no  S/I no  H/I no    Pt unable to rate anxiety, depression or fully participate in assessment related to confusion. Pt denies feeling upset, sad or in pain this morning. Pt does become frustrated if he is unable to do a task and will curse occasionally. Pt easy to calm and redirect. No signs of aggression noted with pt. Pt takes medications will with direction from staff.       Intervention  Instructed in med usage and effects, encouraged to verbalize needs. Meds administered as ordered.  Reinforce medication information often        Response  Pt sates understanding but is forgetful and confused. Staff to reorient as needed.          Plan  Continue to monitor for safety/  every 15 minute monitoring checks.

## 2017-02-15 NOTE — NURSING NOTE
Behavior   Anxiety 5  Depression 5  Pain 0  AVH no  S/I no  H/I no    PT IS SLEEPING. EARLIER HE WAS A LITTLE IRRITATED AND DISORIENTED TO WHERE HIS ROOM WAS, AND WENT INTO OTHERS ROOMS. HE WAS REDIRECTED BY STAFF EASILY.         Intervention  Instructed in med usage and effects, encouraged to verbalize needs. Meds administered as ordered.          Response  Verbalized understanding           Plan  Continue to monitor for safety/  every 15 minute monitoring checks.

## 2017-02-15 NOTE — CONSULTS
"Adult Nutrition  Assessment    Patient Name:  Naresh Saunders  YOB: 1940  MRN: [id number]  Admit Date:  2/10/2017    Assessment Date:  2/15/2017          Reason for Assessment       02/15/17 1743    Reason for Assessment    Reason For Assessment/Visit follow up protocol                Anthropometrics       02/15/17 1747    Anthropometrics    Height 172.7 cm (67.99\")    Ideal Body Weight (IBW)    Ideal Body Weight (IBW), Male (kg) 70.87    % Ideal Body Weight Malnutrition 80-90% - mild deficit    Body Mass Index (BMI)    BMI Grade 19.1 - 24.9 - normal            Labs/Tests/Procedures/Meds       02/15/17 1748    Labs/Tests/Procedures/Meds    Labs/Tests Review Reviewed    Medication Review Reviewed, pertinent                Nutrition Prescription Ordered       02/15/17 1749    Nutrition Prescription PO    Current PO Diet Regular    Supplement Ensure Complete    Supplement Frequency 3 times a day            Evaluation of Received Nutrient/Fluid Intake       02/15/17 1751    PO Evaluation    Number of Meals 14    % PO Intake 100% - 7x, 75% - 4x, 50% - 2x, 25% - 1x            Comments:  Pt appeared confused.  RN indicates pt is taking the Ensure.  Intake 100% - 7x, 75% - 4x, 50% - 2x, 25% - 1x.  Meds and labs reviewed.        Electronically signed by:  Ciera Piper RD  02/15/17 5:54 PM  "

## 2017-02-15 NOTE — NURSING NOTE
Behavior   Anxiety 0  Depression 0  Pain 0  AVH no  S/I no  H/I no    Pt has been up most of the day in sarita dayroom area, pt remains confused but pleasant and quiet. Pt is easy to redirect. Pt takes meds well.       Intervention  Instructed in med usage and effects, encouraged to verbalize needs. Meds administered as ordered.  Reinforce teaching with patient often        Response  Pt able to follow instructions with staff prompting          Plan  Continue to monitor for safety/  every 15 minute monitoring checks.

## 2017-02-15 NOTE — PLAN OF CARE
Problem: BH Patient Care Overview (Adult)  Goal: Plan of Care Review  Outcome: Ongoing (interventions implemented as appropriate)  Goal: Interdisciplinary Rounds/Family Conference  Outcome: Ongoing (interventions implemented as appropriate)  Goal: Individualization and Mutuality  Outcome: Ongoing (interventions implemented as appropriate)  Goal: Discharge Needs Assessment  Outcome: Ongoing (interventions implemented as appropriate)    Problem:  Overarching Goals  Goal: Adheres to Safety Considerations for Self and Others  Outcome: Ongoing (interventions implemented as appropriate)  Goal: Optimized Coping Skills in Response to Life Stressors  Outcome: Ongoing (interventions implemented as appropriate)  Goal: Develops/Participates in Therapeutic [place] to Support Successful Transition  Outcome: Ongoing (interventions implemented as appropriate)

## 2017-02-16 LAB — VALPROATE SERPL-MCNC: 57.4 MCG/ML (ref 50–120)

## 2017-02-16 PROCEDURE — 80164 ASSAY DIPROPYLACETIC ACD TOT: CPT | Performed by: NURSE PRACTITIONER

## 2017-02-16 PROCEDURE — 99232 SBSQ HOSP IP/OBS MODERATE 35: CPT | Performed by: NURSE PRACTITIONER

## 2017-02-16 RX ADMIN — TRAZODONE HYDROCHLORIDE 50 MG: 50 TABLET ORAL at 20:25

## 2017-02-16 RX ADMIN — MEMANTINE 5 MG: 5 TABLET ORAL at 17:06

## 2017-02-16 RX ADMIN — DIVALPROEX SODIUM 750 MG: 500 TABLET, FILM COATED, EXTENDED RELEASE ORAL at 20:24

## 2017-02-16 RX ADMIN — MEMANTINE 5 MG: 5 TABLET ORAL at 08:10

## 2017-02-16 RX ADMIN — NICOTINE 1 PATCH: 14 PATCH, EXTENDED RELEASE TRANSDERMAL at 10:50

## 2017-02-16 RX ADMIN — DONEPEZIL HYDROCHLORIDE 5 MG: 5 TABLET, FILM COATED ORAL at 20:24

## 2017-02-16 NOTE — NURSING NOTE
Behavior   Anxiety 0  Depression 0  Pain 0  AVH no  S/I no  H/I no    Pt is confused and unable to rate anxiety/depression.  Pt unable to participate fully in assessment related to confusion. Pt cooperative and calm with staff. Pt is easy to redirect        Intervention  Encouraged to verbalize needs. Meds administered as ordered.          Response  Pt unable to be reoriented.           Plan  Continue to monitor for safety/  every 15 minute monitoring checks.

## 2017-02-16 NOTE — PLAN OF CARE
Problem:  Patient Care Overview (Adult)  Goal: Plan of Care Review  Outcome: Ongoing (interventions implemented as appropriate)  Goal: Interdisciplinary Rounds/Family Conference  Outcome: Ongoing (interventions implemented as appropriate)  Goal: Individualization and Mutuality  Outcome: Outcome(s) achieved Date Met:  02/16/17  Goal: Discharge Needs Assessment  Outcome: Ongoing (interventions implemented as appropriate)    Problem:  Overarching Goals  Goal: Adheres to Safety Considerations for Self and Others  Outcome: Ongoing (interventions implemented as appropriate)  Goal: Optimized Coping Skills in Response to Life Stressors  Outcome: Ongoing (interventions implemented as appropriate)  Goal: Develops/Participates in Therapeutic [place] to Support Successful Transition  Outcome: Ongoing (interventions implemented as appropriate)

## 2017-02-16 NOTE — PLAN OF CARE
Problem: BH Patient Care Overview (Adult)  Goal: Plan of Care Review  Outcome: Ongoing (interventions implemented as appropriate)    02/16/17 0532   Coping/Psychosocial Response Interventions   Plan Of Care Reviewed With patient   Coping/Psychosocial   Patient Agreement with Plan of Care unable to participate   Patient Care Overview   Progress no change       Goal: Individualization and Mutuality  Outcome: Ongoing (interventions implemented as appropriate)  Goal: Discharge Needs Assessment  Outcome: Ongoing (interventions implemented as appropriate)    Problem: BH Overarching Goals  Goal: Adheres to Safety Considerations for Self and Others  Outcome: Ongoing (interventions implemented as appropriate)  Goal: Optimized Coping Skills in Response to Life Stressors  Outcome: Ongoing (interventions implemented as appropriate)  Goal: Develops/Participates in Therapeutic [place] to Support Successful Transition  Outcome: Ongoing (interventions implemented as appropriate)

## 2017-02-16 NOTE — NURSING NOTE
Behavior   Anxiety 0  Depression 0  Pain 0  AVH no  S/I no  H/I no     Pt unable to complete assessment related to confusion. Pt calm and cooperative with staff.             Intervention  Encouraged to verbalize needs. Meds administered as ordered.          Response  Staff to be available to patient as needed.          Plan  Continue to monitor for safety/  every 15 minute monitoring checks.

## 2017-02-16 NOTE — NURSING NOTE
"Behavior   Anxiety 0  Depression 0  Pain 0  AVH no  S/I no  H/I no   Pt. Redirected out of nursing station & peers room, pt. Becomes frustrated and argumentive. Pt. Questioned medication he was scheduled to take & became upset asking \" just who said I am to take these pills\". Pt. Provided active listening & support with good results. Pt. Watching TV and eating snack. Pt. Confused and alert to name only.      Intervention  Instructed in med usage and effects, encouraged to verbalize needs. Meds administered as ordered.      Response  Accepted medications.    Plan  Continue to monitor for safety/  every 15 minute monitoring checks.  "

## 2017-02-16 NOTE — PROGRESS NOTES
"    2/16/2017     Chief Complaint: suicidal ideation, dementia related behavioral issues, physical aggression and psychosis     Subjective:  Patient is a 76 y.o. male who was hospitalized for dementia related behavioral issues. Since yesterday the patient has been stable. He is oriented to self. He become easily frustrated and may briefly yell or swear. He is redirectable. Feeds himself. Ambulates independently. Able to do simple addition. Does not know the president. Does not know where he is. He knows where he went to high school. Does not know where he lives. He denies pain. Feels he is doing well.     Objective       Vital Signs     Visit Vitals   • /67 (BP Location: Right arm, Patient Position: Sitting)   • Pulse 58   • Temp 98 °F (36.7 °C) (Oral)   • Resp 18   • Ht 67.99\" (172.7 cm)   • Wt 134 lb 14.7 oz (61.2 kg)   • SpO2 96%   • BMI 20.51 kg/m2        Physical Exam:   General Appearance: alert, appears stated age and cooperative, hard of hearing.  Hygiene: fair  Gait & Station: Needs Assistance  Musculoskeletal: No abnormal movements noted.     Mental Status Exam:   Cooperation: Cooperative  Eye Contact: Fair  Psychomotor Behavior: Appropriate  Mood: Euthymic  Affect: flat  Speech: offers a few word answer to questions asks.    Thought Process: Poverty of thought  Associations: Disorganized  Thought Content:   Unable to demonstrate  Suicidal: None  Homicidal: None  Hallucinations: None and Not demonstrated today  Delusion: None  Cognitive Functioning: Unable to interpret abstract concepts  Orientation: Person  Reliability: poor  Insight: Poor  Judgement: Impaired  Impulse Control: Fair  Appetite: Good  Sleeping: well     Diagnostic Studies    Valproic Acid: 57.4 therapeutic    CBC, CMP,TSH, UDS, acetaminophen level, salicylate level, ethanol level, U/A all normal except       MCHC is slightly low at 33 but H&H is normal at 14.6 over 44.2. Creatinine is 1.08, sodium slightly low at 132 and alkaline " phosphatase slightly elevated at 155. Ethanol is less than 10 troponin is less than 0.012 urine drug screen is all negative and urinalysis is unremarkable.      Non-contrasted CT of the head shows moderate cortical atrophy with ventricular dilatation without acute change. Chest x-ray shows mild interstitial prominence in the mid and lower lungs bilaterally. There is no focal infiltrate.        Medicine:   Current Facility-Administered Medications:   • acetaminophen (TYLENOL) tablet 650 mg, 650 mg, Oral, Q4H PRN, Fahad Camara MD  • aluminum-magnesium hydroxide-simethicone (MAALOX/MYLANTA) suspension 30 mL, 30 mL, Oral, Q6H PRN, Fahad Camara MD  • CloNIDine (CATAPRES) tablet 0.1 mg, 0.1 mg, Oral, Q4H PRN, Fahad Camara MD  • divalproex (DEPAKOTE) 24 hr tablet 750 mg, 750 mg, Oral, Nightly, Fahad Camara MD, 750 mg at 02/14/17 2020  • docusate sodium (COLACE) capsule 100 mg, 100 mg, Oral, BID PRN, Fahad Camara MD  • donepezil (ARICEPT) tablet 5 mg, 5 mg, Oral, Nightly, Fahad Camara MD, 5 mg at 02/14/17 2020  • hydrOXYzine (VISTARIL) capsule 50 mg, 50 mg, Oral, Q6H PRN, Fahad Camara MD, 50 mg at 02/14/17 1308  • loperamide (IMODIUM) capsule 2 mg, 2 mg, Oral, 4x Daily PRN, Fahad Camara MD  • magnesium hydroxide (MILK OF MAGNESIA) suspension 2400 mg/10mL 10 mL, 10 mL, Oral, Daily PRN, Fahad Camara MD  • memantine (NAMENDA) tablet 5 mg, 5 mg, Oral, BID, Fahad Camara MD, 5 mg at 02/15/17 0823  • nicotine (NICODERM CQ) 14 MG/24HR patch 1 patch, 1 patch, Transdermal, Q24H, Fahad Camara MD, 1 patch at 02/15/17 1021  • traZODone (DESYREL) tablet 50 mg, 50 mg, Oral, Nightly PRN, Fahad Camara MD, 50 mg at 02/14/17 2020     Diagnoses/Assessment:   Active Problems:  Major neurocognitive disorder due to Alzheimer's disease, probable, with behavioral disturbance  Bipolar and related disorder due to another medical condition with mixed features        Treatment  Plan:     1) Will continue care for the patient on the behavioral health unit at [facility] to ensure patient safety.  2) Will continue to provide treatment with the unit milieu, activities, therapies and psychopharmacological management.  3) Patient to be placed on or continued on Q15 minute checks and Elopement, Aggression and Fall precautions.  4) Will continue medical management by Dr. Villeda's consult.  5) Will order following labs: Depakote level therapeutic  6) Will make the following medication changes: none  7) Will continue discharge planning as appropriate for patient.     Treatment plan and medication risks and benefits discussed with: Patient     Stephania Cathy Lira, APRN  02/16/17

## 2017-02-16 NOTE — PLAN OF CARE
Problem: BH Patient Care Overview (Adult)  Goal: Plan of Care Review  Outcome: Ongoing (interventions implemented as appropriate)  Encouraged intake of meals and supplements.    02/15/17 1800   Coping/Psychosocial Response Interventions   Plan Of Care Reviewed With patient   Patient Care Overview   Progress no change   Outcome Evaluation   Outcome Summary/Follow up Plan Intake 100% - 7x, 75% - 4x, 50% - 1x, 25% -1x

## 2017-02-17 PROCEDURE — 99232 SBSQ HOSP IP/OBS MODERATE 35: CPT | Performed by: NURSE PRACTITIONER

## 2017-02-17 RX ADMIN — MEMANTINE 5 MG: 5 TABLET ORAL at 17:20

## 2017-02-17 RX ADMIN — TRAZODONE HYDROCHLORIDE 50 MG: 50 TABLET ORAL at 20:21

## 2017-02-17 RX ADMIN — DONEPEZIL HYDROCHLORIDE 5 MG: 5 TABLET, FILM COATED ORAL at 20:21

## 2017-02-17 RX ADMIN — MEMANTINE 5 MG: 5 TABLET ORAL at 09:00

## 2017-02-17 RX ADMIN — DIVALPROEX SODIUM 750 MG: 500 TABLET, FILM COATED, EXTENDED RELEASE ORAL at 20:21

## 2017-02-17 NOTE — PROGRESS NOTES
2/17/2017    Chief Complaint: suicidal ideation, dementia related behavioral issues, physical aggression and psychosis    Subjective:  Patient is a 76 y.o. male who was hospitalized for suicidal ideation, dementia related behavioral issues, physical aggression and psychosis.   Since yesterday the patient has been more vocal and yelling out. He is medication compliant. More disheveled today than yesterday. Seems unaware of hygiene.  Further history reported: denies pain. Able to complete some simple math; 2+2, 4+4. He is not aware of current events. Showing very little interest in television today.     Objective     Vital Signs    Temp:  [96.5 °F (35.8 °C)-98.2 °F (36.8 °C)] 98.2 °F (36.8 °C)  Heart Rate:  [53-64] 53  Resp:  [18] 18  BP: (132-142)/(61-69) 132/61    Physical Exam:   General Appearance: alert, appears stated age and cooperative, hard of hearing.  Hygiene: fair  Gait & Station: Needs Assistance  Musculoskeletal: No abnormal movements noted.      Mental Status Exam:   Cooperation: Cooperative  Eye Contact: Fair  Psychomotor Behavior: Appropriate  Mood: Euthymic  Affect: flat  Speech: offers a few word answer to questions asks.    Thought Process: Poverty of thought  Associations: Disorganized  Thought Content:   Unable to demonstrate  Suicidal: None  Homicidal: None  Hallucinations: None and Not demonstrated today  Delusion: None  Cognitive Functioning: Unable to interpret abstract concepts  Orientation: Person  Reliability: poor  Insight: Poor  Judgement: Impaired  Impulse Control: Fair  Appetite: Good  Sleeping: well      Medicine:   Current Facility-Administered Medications:   •  acetaminophen (TYLENOL) tablet 650 mg, 650 mg, Oral, Q4H PRN, Fahad Camara MD  •  aluminum-magnesium hydroxide-simethicone (MAALOX/MYLANTA) suspension 30 mL, 30 mL, Oral, Q6H PRN, Fahad Camara MD  •  CloNIDine (CATAPRES) tablet 0.1 mg, 0.1 mg, Oral, Q4H PRN, Fahad Camara MD  •  divalproex (DEPAKOTE) 24  hr tablet 750 mg, 750 mg, Oral, Nightly, Fahad Camara MD, 750 mg at 02/16/17 2024  •  docusate sodium (COLACE) capsule 100 mg, 100 mg, Oral, BID PRN, Fahad Camara MD  •  donepezil (ARICEPT) tablet 5 mg, 5 mg, Oral, Nightly, Fahad Camara MD, 5 mg at 02/16/17 2024  •  hydrOXYzine (VISTARIL) capsule 50 mg, 50 mg, Oral, Q6H PRN, Fahad Camara MD, 50 mg at 02/14/17 1308  •  loperamide (IMODIUM) capsule 2 mg, 2 mg, Oral, 4x Daily PRN, Fahad Camara MD  •  magnesium hydroxide (MILK OF MAGNESIA) suspension 2400 mg/10mL 10 mL, 10 mL, Oral, Daily PRN, Fahad Camara MD  •  memantine (NAMENDA) tablet 5 mg, 5 mg, Oral, BID, Fahad Camara MD, 5 mg at 02/17/17 0900  •  nicotine (NICODERM CQ) 14 MG/24HR patch 1 patch, 1 patch, Transdermal, Q24H, Fahad Camara MD, 1 patch at 02/16/17 1050  •  traZODone (DESYREL) tablet 50 mg, 50 mg, Oral, Nightly PRN, Fahad Camara MD, 50 mg at 02/16/17 2025    Diagnoses/Assessment:   Active Problems:    Major neurocognitive disorder due to Alzheimer's disease, probable, with behavioral disturbance    Bipolar and related disorder due to another medical condition with mixed features      Treatment Plan:    1) Will continue care for the patient on the behavioral health unit at [facility] to ensure patient safety.  2) Will continue to provide treatment with the unit milieu, activities, therapies and psychopharmacological management.  3) Patient to be placed on or continued on Q15 minute checks and Elopement, Aggression and Fall precautions.  4) Will continue medical management by Dr. Villeda's consult.  5) Will order following labs: Depakote level therapeutic  6) Will make the following medication changes: none  7) Will continue discharge planning as appropriate for patient.  Treatment plan and medication risks and benefits discussed with: staff    DIANA Cook  02/17/17  3:38 PM

## 2017-02-17 NOTE — PLAN OF CARE
Problem: BH Patient Care Overview (Adult)  Goal: Plan of Care Review  Outcome: Ongoing (interventions implemented as appropriate)    02/17/17 0516   Coping/Psychosocial Response Interventions   Plan Of Care Reviewed With patient   Coping/Psychosocial   Patient Agreement with Plan of Care unable to participate   Patient Care Overview   Progress no change       Goal: Discharge Needs Assessment  Outcome: Ongoing (interventions implemented as appropriate)    Problem:  Overarching Goals  Goal: Adheres to Safety Considerations for Self and Others  Outcome: Ongoing (interventions implemented as appropriate)  Goal: Optimized Coping Skills in Response to Life Stressors  Outcome: Ongoing (interventions implemented as appropriate)  Goal: Develops/Participates in Therapeutic [place] to Support Successful Transition  Outcome: Ongoing (interventions implemented as appropriate)

## 2017-02-17 NOTE — NURSING NOTE
Behavior   Anxiety 0  Depression 0  Pain 0  AVH no  S/I no  H/I no            Intervention  Instructed in med usage and effects, encouraged to verbalize needs. Meds administered as ordered. Pt. Continues to be monitored as ordered per pt.care plan.          Response  Verbalized understanding to the best of his ability.          Plan  Continue to monitor for safety/  every 15 minute monitoring checks. Pt.continues to be monitored as ordered per pt. Care plan.

## 2017-02-17 NOTE — PLAN OF CARE
Problem: BH Patient Care Overview (Adult)  Goal: Plan of Care Review  Outcome: Ongoing (interventions implemented as appropriate)  Goal: Discharge Needs Assessment  Outcome: Ongoing (interventions implemented as appropriate)    Problem:  Overarching Goals  Goal: Adheres to Safety Considerations for Self and Others  Outcome: Ongoing (interventions implemented as appropriate)  Goal: Optimized Coping Skills in Response to Life Stressors  Outcome: Ongoing (interventions implemented as appropriate)  Goal: Develops/Participates in Therapeutic [place] to Support Successful Transition  Outcome: Ongoing (interventions implemented as appropriate)

## 2017-02-17 NOTE — NURSING NOTE
Behavior   Anxiety 0  Depression 0  Pain 0  AVH no  S/I no  H/I no   Pt. Required redirection from nursing station & peers room. Pt. Becomes upset when redirected. Pt. Watching TV & eating a snack. No aggressive behaviors noted.      Intervention  Instructed in med usage and effects, encouraged to verbalize needs. Meds administered as ordered.          Response  confused & unable to fully understand        Plan  Continue to monitor for safety/  every 15 minute monitoring checks.

## 2017-02-18 PROCEDURE — 99232 SBSQ HOSP IP/OBS MODERATE 35: CPT | Performed by: NURSE PRACTITIONER

## 2017-02-18 RX ADMIN — MEMANTINE 5 MG: 5 TABLET ORAL at 09:10

## 2017-02-18 RX ADMIN — DONEPEZIL HYDROCHLORIDE 5 MG: 5 TABLET, FILM COATED ORAL at 20:29

## 2017-02-18 RX ADMIN — DIVALPROEX SODIUM 750 MG: 500 TABLET, FILM COATED, EXTENDED RELEASE ORAL at 20:29

## 2017-02-18 RX ADMIN — MEMANTINE 5 MG: 5 TABLET ORAL at 17:17

## 2017-02-18 RX ADMIN — HYDROXYZINE PAMOATE 50 MG: 50 CAPSULE ORAL at 20:29

## 2017-02-18 RX ADMIN — TRAZODONE HYDROCHLORIDE 50 MG: 50 TABLET ORAL at 20:29

## 2017-02-18 NOTE — NURSING NOTE
Behavior   Anxiety 0  Depression 0  Pain 0  AVH no  S/I no  H/I no    Pt sitting in sarita day room with peers, oriented to name only will answer very simple questions only. Will follow commands and re direct for short periods of time, somewhat sleepy, will wander into other rooms.        Intervention  Instructed in med usage and effects, encouraged to verbalize needs. Meds administered as ordered.          Response  Taking pm meds without difficulty          Plan  Continue to monitor for safety/  every 15 minute monitoring checks.

## 2017-02-18 NOTE — NURSING NOTE
Behavior   Anxiety 0  Depression 0  Pain 0  AVH no  S/I no  H/I no            Intervention  Instructed in med usage and effects, encouraged to verbalize needs. Meds administered as ordered. Pt. Continues to be monitored as ordered per pt. Care plan.          Response  Pt. Has been wandering around the halls most of the day and checks for open doors. He took a shower was a little aggressive with the shower but did not hurt anyone.          Plan  Continue to monitor for safety/  every 15 minute monitoring checks. Pt. Continues to be monitored as ordered per pt. Care plan.

## 2017-02-18 NOTE — PROGRESS NOTES
"    2/18/2017     Chief Complaint: suicidal ideation, dementia related behavioral issues, physical aggression and psychosis     Subjective:  Patient is a 76 y.o. male who was hospitalized for suicidal ideation, dementia related behavioral issues, physical aggression and psychosis. Since yesterday the patient has been noted to be more calm with less yelling, and better frustration tolerance. Follows staff directioning, but has some difficulties with complex tasks. He is medication compliant. Wearing same clothes today as yesterday. Hair uncombed. He is unshaven. More interested in Television today. Out in the day area, but does not socialize with peer. Remembered that he had visitors yesterday, \"my daughter and wife.\"     Objective        Vital Signs     Visit Vitals   • /58 (BP Location: Left arm, Patient Position: Lying)   • Pulse 61   • Temp 97.1 °F (36.2 °C) (Tympanic)   • Resp 16   • Ht 67.99\" (172.7 cm)   • Wt 134 lb 14.7 oz (61.2 kg)   • SpO2 96%   • BMI 20.51 kg/m2          Physical Exam:   General Appearance: alert, appears stated age and cooperative, hard of hearing.  Hygiene: fair  Gait & Station: slow shuffled gait  Musculoskeletal: No abnormal movements noted.      Mental Status Exam:   Cooperation: Cooperative  Eye Contact: Fair  Psychomotor Behavior: Appropriate  Mood: some irritability  Affect: full  Speech: offering more conversation today  Thought Process: Poverty of thought  Associations: Disorganized  Thought Content:   Unable to demonstrate  Suicidal: None  Homicidal: None  Hallucinations: None and Not demonstrated today  Delusion: None  Cognitive Functioning: Unable to interpret abstract concepts  Orientation: Person  Reliability: poor  Insight: Poor  Judgement: Impaired  Impulse Control: Fair  Appetite: Good  Sleeping: well        Medicine:   Current Facility-Administered Medications:   • acetaminophen (TYLENOL) tablet 650 mg, 650 mg, Oral, Q4H PRN, Fahad Camara MD  • " aluminum-magnesium hydroxide-simethicone (MAALOX/MYLANTA) suspension 30 mL, 30 mL, Oral, Q6H PRN, Fahad Camara MD  • CloNIDine (CATAPRES) tablet 0.1 mg, 0.1 mg, Oral, Q4H PRN, aFhad Camara MD  • divalproex (DEPAKOTE) 24 hr tablet 750 mg, 750 mg, Oral, Nightly, Fahad Camara MD, 750 mg at 02/16/17 2024  • docusate sodium (COLACE) capsule 100 mg, 100 mg, Oral, BID PRN, Fahad Camara MD  • donepezil (ARICEPT) tablet 5 mg, 5 mg, Oral, Nightly, Fahad Camara MD, 5 mg at 02/16/17 2024  • hydrOXYzine (VISTARIL) capsule 50 mg, 50 mg, Oral, Q6H PRN, Fahad Camara MD, 50 mg at 02/14/17 1308  • loperamide (IMODIUM) capsule 2 mg, 2 mg, Oral, 4x Daily PRN, Fahad Camara MD  • magnesium hydroxide (MILK OF MAGNESIA) suspension 2400 mg/10mL 10 mL, 10 mL, Oral, Daily PRN, Fahad Camara MD  • memantine (NAMENDA) tablet 5 mg, 5 mg, Oral, BID, Fahad Camara MD, 5 mg at 02/17/17 0900  • nicotine (NICODERM CQ) 14 MG/24HR patch 1 patch, 1 patch, Transdermal, Q24H, Fahad Camara MD, 1 patch at 02/16/17 1050  • traZODone (DESYREL) tablet 50 mg, 50 mg, Oral, Nightly PRN, Fahad Camara MD, 50 mg at 02/16/17 2025     Diagnoses/Assessment:   Active Problems:  Major neurocognitive disorder due to Alzheimer's disease, probable, with behavioral disturbance  Bipolar and related disorder due to another medical condition with mixed features        Treatment Plan:     1) Will continue care for the patient on the behavioral health unit at [facility] to ensure patient safety.  2) Will continue to provide treatment with the unit milieu, activities, therapies and psychopharmacological management.  3) Patient to be placed on or continued on Q15 minute checks and Elopement, Aggression and Fall precautions.  4) Will continue medical management by Dr. Villeda's consult.  5) Will order following labs: Depakote level therapeutic  6) Will make the following medication changes: none  7) Will  continue discharge planning as appropriate for patient.  Treatment plan and medication risks and benefits discussed with: staff     DIANA Cook

## 2017-02-18 NOTE — PLAN OF CARE
Problem: BH Patient Care Overview (Adult)  Goal: Plan of Care Review  Outcome: Ongoing (interventions implemented as appropriate)  Goal: Interdisciplinary Rounds/Family Conference  Outcome: Ongoing (interventions implemented as appropriate)  Goal: Discharge Needs Assessment  Outcome: Ongoing (interventions implemented as appropriate)    Problem:  Overarching Goals  Goal: Adheres to Safety Considerations for Self and Others  Outcome: Ongoing (interventions implemented as appropriate)  Goal: Optimized Coping Skills in Response to Life Stressors  Outcome: Ongoing (interventions implemented as appropriate)  Goal: Develops/Participates in Therapeutic [place] to Support Successful Transition  Outcome: Ongoing (interventions implemented as appropriate)    Problem: Fall Risk (Adult)  Goal: Identify Related Risk Factors and Signs and Symptoms  Outcome: Ongoing (interventions implemented as appropriate)  Remains confused/agitated  Goal: Absence of Falls  Outcome: Ongoing (interventions implemented as appropriate)  No falls this shift

## 2017-02-18 NOTE — PLAN OF CARE
Problem: BH Patient Care Overview (Adult)  Goal: Plan of Care Review  Outcome: Ongoing (interventions implemented as appropriate)  Goal: Discharge Needs Assessment  Outcome: Ongoing (interventions implemented as appropriate)    Problem:  Overarching Goals  Goal: Adheres to Safety Considerations for Self and Others  Outcome: Ongoing (interventions implemented as appropriate)  Goal: Optimized Coping Skills in Response to Life Stressors  Outcome: Ongoing (interventions implemented as appropriate)  Goal: Develops/Participates in Therapeutic [place] to Support Successful Transition  Outcome: Ongoing (interventions implemented as appropriate)    Problem: Fall Risk (Adult)  Goal: Identify Related Risk Factors and Signs and Symptoms  Outcome: Ongoing (interventions implemented as appropriate)  Goal: Absence of Falls  Outcome: Ongoing (interventions implemented as appropriate)

## 2017-02-18 NOTE — NURSING NOTE
Behavior   Anxiety 0  Depression 0  Pain 0  AVH no  S/I no  H/I no            Intervention  Instructed in med usage and effects, encouraged to verbalize needs. Meds administered as ordered. Pt. Continues to be monitored as ordered per care treatment plan.          Response:    Pt is unable to understand things fully.       Plan  Continue to monitor for safety/  every 15 minute monitoring checks. Pt. Continues to be monitored per care treatment plan.

## 2017-02-19 PROCEDURE — 99232 SBSQ HOSP IP/OBS MODERATE 35: CPT | Performed by: PSYCHIATRY & NEUROLOGY

## 2017-02-19 RX ORDER — TRAZODONE HYDROCHLORIDE 100 MG/1
100 TABLET ORAL NIGHTLY
Status: DISCONTINUED | OUTPATIENT
Start: 2017-02-19 | End: 2017-02-23 | Stop reason: HOSPADM

## 2017-02-19 RX ORDER — DONEPEZIL HYDROCHLORIDE 10 MG/1
10 TABLET, FILM COATED ORAL NIGHTLY
Status: DISCONTINUED | OUTPATIENT
Start: 2017-02-19 | End: 2017-02-23 | Stop reason: HOSPADM

## 2017-02-19 RX ADMIN — DIVALPROEX SODIUM 750 MG: 500 TABLET, FILM COATED, EXTENDED RELEASE ORAL at 20:09

## 2017-02-19 RX ADMIN — MEMANTINE 5 MG: 5 TABLET ORAL at 17:38

## 2017-02-19 RX ADMIN — HYDROXYZINE PAMOATE 50 MG: 50 CAPSULE ORAL at 20:10

## 2017-02-19 RX ADMIN — MEMANTINE 5 MG: 5 TABLET ORAL at 09:28

## 2017-02-19 RX ADMIN — TRAZODONE HYDROCHLORIDE 100 MG: 100 TABLET ORAL at 20:10

## 2017-02-19 RX ADMIN — DONEPEZIL HYDROCHLORIDE 10 MG: 10 TABLET ORAL at 20:10

## 2017-02-19 NOTE — PROGRESS NOTES
2/19/2017    Chief Complaint: dementia related behavioral issues    Subjective:  Patient is a 76 y.o. male who was hospitalized for dementia related behavioral issues.   Since yesterday the patient cont to struggle with dementia and behavioral issues.  He has had choppy sleep. He has increase in confusion and behaviors in the evening. He has not been aggresive but will yell and demand to get behind the nurses station in the evenings. During the day he is fine.  Patient reports medications are tolerable and effective.   Objective     Vital Signs    Temp:  [96 °F (35.6 °C)-96.5 °F (35.8 °C)] 96.5 °F (35.8 °C)  Heart Rate:  [46-53] 46  Resp:  [16-18] 16  BP: (110-121)/(55-64) 110/55    Physical Exam:   General Appearance: alert, appears stated age and cooperative,  Hygiene: fair  Gait & Station: Normal  Musculoskeletal: No tremors or abnormal involuntary movements}     Mental Status Exam:   Cooperation: Cooperative  Eye Contact: Fair  Psychomotor Behavior: Restless  Mood: confused  Affect: mood-congruent  Speech: Normal  Thought Process: Poverty of thought and [place]  Associations: Goal Directed  Thought Content:   Normal  Suicidal: None  Homicidal: None  Hallucinations: None  Delusion: None  Cognitive Functioning:  Consciousness: awake and alert and Orientation: Person  Reliability: poor  Insight: Poor  Judgement: Poor  Impulse Control: Poor    Lab Results (last 24 hours)     ** No results found for the last 24 hours. **          Results for NGOZI VÁZQUEZ (MRN [id number]) as of 2/19/2017 12:42   Ref. Range 2/16/2017 08:57   Valproic Acid Latest Ref Range: 50.0 - 120.0 mcg/mL 57.4     Imaging Results (last 24 hours)     ** No results found for the last 24 hours. **          Medicine:   Current Facility-Administered Medications:   •  acetaminophen (TYLENOL) tablet 650 mg, 650 mg, Oral, Q4H PRN, Fahad Camara MD  •  aluminum-magnesium hydroxide-simethicone (MAALOX/MYLANTA) suspension 30 mL, 30 mL, Oral, Q6H  PRN, Fahad Camara MD  •  CloNIDine (CATAPRES) tablet 0.1 mg, 0.1 mg, Oral, Q4H PRN, Fahad Camara MD  •  divalproex (DEPAKOTE) 24 hr tablet 750 mg, 750 mg, Oral, Nightly, Fahad Camara MD, 750 mg at 02/18/17 2029  •  docusate sodium (COLACE) capsule 100 mg, 100 mg, Oral, BID PRN, Fahad Camara MD  •  donepezil (ARICEPT) tablet 5 mg, 5 mg, Oral, Nightly, Fahad Camara MD, 5 mg at 02/18/17 2029  •  hydrOXYzine (VISTARIL) capsule 50 mg, 50 mg, Oral, Q6H PRN, Fahad Camara MD, 50 mg at 02/18/17 2029  •  loperamide (IMODIUM) capsule 2 mg, 2 mg, Oral, 4x Daily PRN, Fahad Camara MD  •  magnesium hydroxide (MILK OF MAGNESIA) suspension 2400 mg/10mL 10 mL, 10 mL, Oral, Daily PRN, Fahad Camara MD  •  memantine (NAMENDA) tablet 5 mg, 5 mg, Oral, BID, Fahad Camara MD, 5 mg at 02/19/17 0928  •  nicotine (NICODERM CQ) 14 MG/24HR patch 1 patch, 1 patch, Transdermal, Q24H, Fahad Camara MD, 1 patch at 02/16/17 1050  •  traZODone (DESYREL) tablet 50 mg, 50 mg, Oral, Nightly PRN, Fahad Camara MD, 50 mg at 02/18/17 2029    Diagnoses/Assessment:   Active Problems:    Major neurocognitive disorder due to Alzheimer's disease, probable, with behavioral disturbance    Bipolar and related disorder due to another medical condition with mixed features      Treatment Plan:    1) Will continue care for the patient on the behavioral health unit at [facility] to ensure patient safety.  2) Will continue to provide treatment with the unit milieu, activities, therapies and psychopharmacological management.  3) Patient to be placed on or continued on  Q15 minute checks  and Elopement, Aggression and Fall precautions.  4) Will continue medical management by Dr. Villeda.  5) Will order following labs: none  6) Will make the following medication changes: Will increase the trazodone to 100mg to help with sleep. Will increase the Aricept to 10mg.  7) Will continue discharge  planning as appropriate for patient.  8) Psychotherapy provided: none    Fahad Camara MD  02/19/17  12:40 PM

## 2017-02-19 NOTE — NURSING NOTE
Behavior   Anxiety 0  Depression 0  Pain 0  AVH no  S/I no  H/I no     Pt in common area upon assessment.  Pt is groomed this evening and sitting at the table finishing a snack.  Pt is without abnormal body movements and maintains good eye contact.  Pt is oriented to person only--pt thinks he is at home in, [place] where his father lives.  Pt thinks his father is living.  He is confused as to why he is here at this hospital and this causes him much frustration.  Pt has been a bit verbally aggressive towards staff this evening utilizing all manner of curse words and insults.  Pt seems restless.  While he denies anxiety he is constantly fidgeting with any object he can grasp or wandering in and out of his and others rooms.      Intervention  Pt redirected/reoriented.  Pt kept safe.  Medications administered and assessment complete    Response  Pt does not respond well to reorientation or redirection.  Pt unable to acquire and manipulate new information.     Plan  Will promote and reinforce current treatment plan and encourage involvement in care plan goals. Will provide for safe, calm, quiet environment. Will promote open communication with staff and foster a trusting/working relationship with patient. Will promote participation in groups and therapies and independent reflection.

## 2017-02-19 NOTE — NURSING NOTE
Behavior   Pt noted to have flat affect most of the day.         Intervention  Frequently pacing up and down the halls, turning his shower on and off.      Response Monitored throughout shift        Plan  Continue to monitor for safety/  every 15 minute monitoring checks. Assist to meet treatment goals.

## 2017-02-19 NOTE — PLAN OF CARE
Problem: BH Patient Care Overview (Adult)  Goal: Discharge Needs Assessment  Outcome: Ongoing (interventions implemented as appropriate)    Problem:  Overarching Goals  Goal: Adheres to Safety Considerations for Self and Others  Outcome: Ongoing (interventions implemented as appropriate)  Goal: Optimized Coping Skills in Response to Life Stressors  Outcome: Ongoing (interventions implemented as appropriate)  Goal: Develops/Participates in Therapeutic [place] to Support Successful Transition  Outcome: Ongoing (interventions implemented as appropriate)

## 2017-02-19 NOTE — PLAN OF CARE
Problem: BH Overarching Goals  Goal: Adheres to Safety Considerations for Self and Others  Outcome: Ongoing (interventions implemented as appropriate)  Goal: Optimized Coping Skills in Response to Life Stressors  Outcome: Ongoing (interventions implemented as appropriate)  Goal: Develops/Participates in Therapeutic [place] to Support Successful Transition  Outcome: Ongoing (interventions implemented as appropriate)    Problem: Fall Risk (Adult)  Goal: Identify Related Risk Factors and Signs and Symptoms  Outcome: Ongoing (interventions implemented as appropriate)  Goal: Absence of Falls  Outcome: Ongoing (interventions implemented as appropriate)

## 2017-02-19 NOTE — NURSING NOTE
Behavior Pt noted to be resting in bed quietly. Woke up to take meds.        Intervention  Instructed in med usage and effects, encouraged to verbalize needs. Meds administered as ordered.          Response  Verbalized understanding           Plan  Continue to monitor for safety/  every 15 minute monitoring checks. Assist to meet treatment goals.

## 2017-02-20 PROCEDURE — 99232 SBSQ HOSP IP/OBS MODERATE 35: CPT | Performed by: PSYCHIATRY & NEUROLOGY

## 2017-02-20 RX ADMIN — MEMANTINE 5 MG: 5 TABLET ORAL at 08:11

## 2017-02-20 RX ADMIN — TRAZODONE HYDROCHLORIDE 100 MG: 100 TABLET ORAL at 20:53

## 2017-02-20 RX ADMIN — DONEPEZIL HYDROCHLORIDE 10 MG: 10 TABLET ORAL at 20:53

## 2017-02-20 RX ADMIN — MEMANTINE 5 MG: 5 TABLET ORAL at 17:10

## 2017-02-20 RX ADMIN — NICOTINE 1 PATCH: 14 PATCH, EXTENDED RELEASE TRANSDERMAL at 08:12

## 2017-02-20 RX ADMIN — DIVALPROEX SODIUM 750 MG: 500 TABLET, FILM COATED, EXTENDED RELEASE ORAL at 20:53

## 2017-02-20 NOTE — NURSING NOTE
Behavior   Anxiety 0  Depression 0  Pain 0  AVH no  S/I no  H/I no  Pt has been resting in bed. Pt has been calm, cooperative, and resting in room.        Intervention  Instructed in med usage and effects, encouraged to verbalize needs. Meds administered as ordered. RN offered self for expression.          Response  Verbalized understanding           Plan  Continue to monitor for safety/  every 15 minute monitoring checks. RN will continue to assess and educate.

## 2017-02-20 NOTE — NURSING NOTE
Behavior   Anxiety 5  Depression 5  Pain 0  AVH no  S/I no  H/I no    PT IS SLEEPING. EARLIER HE STATED THAT HE MISSED HIS FAMILY AND WANTED TO SEE THEM. PT WAS ASSURED THAT THEY CALLED AND STATED THEY WOULD VISIT TOMORROW.          Intervention  Instructed in med usage and effects, encouraged to verbalize needs. Meds administered as ordered.          Response  Verbalized understanding           Plan  Continue to monitor for safety/  every 15 minute monitoring checks.

## 2017-02-20 NOTE — PLAN OF CARE
Problem: BH Patient Care Overview (Adult)  Goal: Plan of Care Review  Outcome: Ongoing (interventions implemented as appropriate)  SW to speak with family to prepare for discharge in 1-2 days.  Goal: Interdisciplinary Rounds/Family Conference  Outcome: Ongoing (interventions implemented as appropriate)    02/20/17 1105   Interdisciplinary Rounds/Family Conf   Participants nursing;patient;psychiatrist;social work;therapeutic recreation;other (see comments)       Goal: Individualization and Mutuality  Outcome: Ongoing (interventions implemented as appropriate)  Goal: Discharge Needs Assessment  Outcome: Ongoing (interventions implemented as appropriate)

## 2017-02-20 NOTE — PLAN OF CARE
Problem: BH Patient Care Overview (Adult)  Goal: Interdisciplinary Rounds/Family Conference  Outcome: Ongoing (interventions implemented as appropriate)  Goal: Discharge Needs Assessment  Outcome: Ongoing (interventions implemented as appropriate)

## 2017-02-20 NOTE — PLAN OF CARE
CSW met with tx team and discussed disposition. Pt has made improvements, has had no real behavioral disturbances. Pt appears to be reaching his baseline. CSW called and informed family that anticipated dc would be in 1-2 days. Daughter asked for dc on Wednesday, 2/22/17. CSW to inform MD.

## 2017-02-20 NOTE — NURSING NOTE
Behavior   Anxiety 0  Depression 0  Pain 0  AVH no  S/I no  H/I no   Pt really unable to rate. Pt says he is anxious for wife and daughter. Pt slept all night. Pt is calm and cooperates A&Ox1 to self. Pt ate breakfast. Speech is clear and at times loose associations.            Intervention  Instructed in med usage and effects, encouraged to verbalize needs. Meds administered as ordered. Pt reoriented.          Response  Verbalized understanding.           Plan  Continue to monitor for safety/  every 15 minute monitoring checks.RN will assess and educate as needed. Staff will reorient as needed.

## 2017-02-20 NOTE — PLAN OF CARE
Problem: BH Patient Care Overview (Adult)  Goal: Plan of Care Review  Outcome: Ongoing (interventions implemented as appropriate)  Goal: Interdisciplinary Rounds/Family Conference  Outcome: Ongoing (interventions implemented as appropriate)  Goal: Individualization and Mutuality  Outcome: Ongoing (interventions implemented as appropriate)  Goal: Discharge Needs Assessment  Outcome: Ongoing (interventions implemented as appropriate)    Problem: BH Overarching Goals  Goal: Adheres to Safety Considerations for Self and Others  Outcome: Ongoing (interventions implemented as appropriate)  Goal: Optimized Coping Skills in Response to Life Stressors  Outcome: Ongoing (interventions implemented as appropriate)  Goal: Develops/Participates in Therapeutic [place] to Support Successful Transition  Outcome: Ongoing (interventions implemented as appropriate)    Problem: Risk for Violence/Aggression Toward Others  Goal: Treatment Goal: Refrain from acts of violence/aggression during length of stay, and demonstrate improved impulse control at the time of discharge  Outcome: Ongoing (interventions implemented as appropriate)  Goal: Verbalize thoughts and feelings associated with:  Outcome: Ongoing (interventions implemented as appropriate)  Goal: Refrain from harming others  Outcome: Ongoing (interventions implemented as appropriate)  Goal: Refrain from destructive acts on the environment or property  Outcome: Ongoing (interventions implemented as appropriate)  Goal: Control angry outbursts  Outcome: Ongoing (interventions implemented as appropriate)  Goal: Attend and participate in unit activities, including therapeutic, recreational, and educational groups  Outcome: Ongoing (interventions implemented as appropriate)  Goal: Identify appropriate positive anger management techniques  Outcome: Ongoing (interventions implemented as appropriate)    Problem: Risk for Self Injury/Neglect  Goal: Treatment Goal: Remain safe during length  of stay, learn and adopt new coping skills, and be free of self-injurious ideation, impulses and acts at the time of discharge  Outcome: Ongoing (interventions implemented as appropriate)  Goal: Verbalize thoughts and feelings associated with:  Outcome: Ongoing (interventions implemented as appropriate)  Goal: Refrain from harming self  Outcome: Ongoing (interventions implemented as appropriate)  Goal: Attend and participate in unit activities, including therapeutic, recreational, and educational groups  Outcome: Ongoing (interventions implemented as appropriate)  Goal: Recognize maladaptive responses and adopt new coping mechanisms  Outcome: Ongoing (interventions implemented as appropriate)  Goal: Complete daily ADLs, including personal hygiene independently, as able  Outcome: Ongoing (interventions implemented as appropriate)    Problem: Fall Risk (Adult)  Goal: Identify Related Risk Factors and Signs and Symptoms  Outcome: Ongoing (interventions implemented as appropriate)  Goal: Absence of Falls  Outcome: Ongoing (interventions implemented as appropriate)

## 2017-02-20 NOTE — PROGRESS NOTES
2/20/2017    Chief Complaint: dementia related behavioral issues    Subjective:  Patient is a 76 y.o. male who was hospitalized for dementia related behavioral issues.   Since yesterday the patient has been stable.  He appears to have slept better last night and had good behavior. Patient reports medications are tolerable and effective.     Objective     Vital Signs    Temp:  [97.8 °F (36.6 °C)] 97.8 °F (36.6 °C)  Heart Rate:  [63] 63  Resp:  [16] 16  BP: (147)/(72) 147/72    Physical Exam:   General Appearance: alert, appears stated age and cooperative,  Hygiene: fair  Gait & Station: Normal  Musculoskeletal: No tremors or abnormal involuntary movements      Mental Status Exam:   Cooperation: Cooperative  Eye Contact: Fair  Psychomotor Behavior: Restless  Mood: confused  Affect: mood-congruent  Speech: Normal  Thought Process: Poverty of thought and [place]  Associations: Goal Directed  Thought Content:   Normal  Suicidal: None  Homicidal: None  Hallucinations: None  Delusion: None  Cognitive Functioning:  Consciousness: awake and alert and Orientation: Person  Reliability: poor  Insight: Poor  Judgement: Poor  Impulse Control: Poor    Lab Results (last 24 hours)     ** No results found for the last 24 hours. **        Imaging Results (last 24 hours)     ** No results found for the last 24 hours. **          Medicine:   Current Facility-Administered Medications:   •  acetaminophen (TYLENOL) tablet 650 mg, 650 mg, Oral, Q4H PRN, Fahad Camara MD  •  aluminum-magnesium hydroxide-simethicone (MAALOX/MYLANTA) suspension 30 mL, 30 mL, Oral, Q6H PRN, Fahad Camara MD  •  CloNIDine (CATAPRES) tablet 0.1 mg, 0.1 mg, Oral, Q4H PRN, Fahad Camara MD  •  divalproex (DEPAKOTE) 24 hr tablet 750 mg, 750 mg, Oral, Nightly, Fahad Camara MD, 750 mg at 02/19/17 2009  •  docusate sodium (COLACE) capsule 100 mg, 100 mg, Oral, BID PRN, Fahad Camara MD  •  donepezil (ARICEPT) tablet 10 mg, 10 mg, Oral,  Nightly, Fahad Camara MD, 10 mg at 02/19/17 2010  •  hydrOXYzine (VISTARIL) capsule 50 mg, 50 mg, Oral, Q6H PRN, Fahad Camara MD, 50 mg at 02/19/17 2010  •  loperamide (IMODIUM) capsule 2 mg, 2 mg, Oral, 4x Daily PRN, Fahad Camara MD  •  magnesium hydroxide (MILK OF MAGNESIA) suspension 2400 mg/10mL 10 mL, 10 mL, Oral, Daily PRN, Fahad Camara MD  •  memantine (NAMENDA) tablet 5 mg, 5 mg, Oral, BID, Fahad Camara MD, 5 mg at 02/20/17 0811  •  nicotine (NICODERM CQ) 14 MG/24HR patch 1 patch, 1 patch, Transdermal, Q24H, Fahad Camara MD, 1 patch at 02/20/17 0812  •  traZODone (DESYREL) tablet 100 mg, 100 mg, Oral, Nightly, Fahad Camara MD, 100 mg at 02/19/17 2010    Diagnoses/Assessment:   Active Problems:    Major neurocognitive disorder due to Alzheimer's disease, probable, with behavioral disturbance    Bipolar and related disorder due to another medical condition with mixed features      Treatment Plan:    1) Will continue care for the patient on the behavioral health unit at [facility] to ensure patient safety.  2) Will continue to provide treatment with the unit milieu, activities, therapies and psychopharmacological management.  3) Patient to be placed on or continued on  Q15 minute checks  and Elopement, Aggression and Fall precautions.  4) Will continue medical management by Dr. Villeda.  5) Will order following labs: none  6) Will make the following medication changes: cont the trazodone, aricept, depakote and namenda  7) Will continue discharge planning as appropriate for patient.  8) Psychotherapy provided: none  Will plan to have family session in the next day or so.    Fahad Camara MD  02/20/17  2:44 PM

## 2017-02-21 PROCEDURE — 99232 SBSQ HOSP IP/OBS MODERATE 35: CPT | Performed by: NURSE PRACTITIONER

## 2017-02-21 RX ADMIN — TRAZODONE HYDROCHLORIDE 100 MG: 100 TABLET ORAL at 20:09

## 2017-02-21 RX ADMIN — DIVALPROEX SODIUM 750 MG: 500 TABLET, FILM COATED, EXTENDED RELEASE ORAL at 20:09

## 2017-02-21 RX ADMIN — MEMANTINE 5 MG: 5 TABLET ORAL at 17:10

## 2017-02-21 RX ADMIN — MEMANTINE 5 MG: 5 TABLET ORAL at 08:06

## 2017-02-21 RX ADMIN — NICOTINE 1 PATCH: 14 PATCH, EXTENDED RELEASE TRANSDERMAL at 08:07

## 2017-02-21 RX ADMIN — DONEPEZIL HYDROCHLORIDE 10 MG: 10 TABLET ORAL at 20:09

## 2017-02-21 NOTE — PROGRESS NOTES
2/21/2017    Chief Complaint: dementia related behavioral issues    Subjective:  Patient is a 76 y.o. male who was hospitalized for dementia related behavioral issues. Since yesterday the patient has been stable, allows staff to assist with cares. Follows directions, but needs to have directions given slowly with repeats at times. No aggressiveness. No attempts to harm himself or other. Is complaint with medications. No side effects noted.    Objective     Vital Signs    Temp:  [97.2 °F (36.2 °C)] 97.2 °F (36.2 °C)  Heart Rate:  [54] 54  Resp:  [16-19] 19  BP: (115-151)/(59-65) 115/59    Physical Exam:   General Appearance: alert, appears stated age and cooperative,  Hygiene: fair  Gait & Station: Normal  Musculoskeletal: No tremors or abnormal involuntary movements      Mental Status Exam:   Cooperation: Cooperative  Eye Contact: poor, downcast  Psychomotor Behavior: Restless  Mood: confused  Affect: mood-congruent  Speech: mumbled, hard to understand at times.  Thought Process: Poverty of thought  Associations: loose  Thought Content: Normal  Suicidal: unable to assess due to confusion  Homicidal: unable to assess due to confusion  Hallucinations: unable to assess due to confusion  Delusion: unable to assess due to confusion  Cognitive Functioning: unable to think in abstract terms. More concrete in conversation. Occasionally uses puns   Consciousness: awake and alert   Orientation: Person only  Reliability: poor  Insight: Poor  Judgement: Poor  Impulse Control: Poor    Lab Results (last 24 hours)     ** No results found for the last 24 hours. **        Imaging Results (last 24 hours)     ** No results found for the last 24 hours. **          Medicine:   Current Facility-Administered Medications:   •  acetaminophen (TYLENOL) tablet 650 mg, 650 mg, Oral, Q4H PRN, Fahad Camara MD  •  aluminum-magnesium hydroxide-simethicone (MAALOX/MYLANTA) suspension 30 mL, 30 mL, Oral, Q6H PRN, Fahad Camara MD  •   CloNIDine (CATAPRES) tablet 0.1 mg, 0.1 mg, Oral, Q4H PRN, Fahad Camara MD  •  divalproex (DEPAKOTE) 24 hr tablet 750 mg, 750 mg, Oral, Nightly, Fahad Camara MD, 750 mg at 02/20/17 2053  •  docusate sodium (COLACE) capsule 100 mg, 100 mg, Oral, BID PRN, Fahad Camara MD  •  donepezil (ARICEPT) tablet 10 mg, 10 mg, Oral, Nightly, Fahad Camara MD, 10 mg at 02/20/17 2053  •  hydrOXYzine (VISTARIL) capsule 50 mg, 50 mg, Oral, Q6H PRN, Fahad Camara MD, 50 mg at 02/19/17 2010  •  loperamide (IMODIUM) capsule 2 mg, 2 mg, Oral, 4x Daily PRN, Fahad Camara MD  •  magnesium hydroxide (MILK OF MAGNESIA) suspension 2400 mg/10mL 10 mL, 10 mL, Oral, Daily PRN, Fahad Camara MD  •  memantine (NAMENDA) tablet 5 mg, 5 mg, Oral, BID, Fahad Camara MD, 5 mg at 02/21/17 0806  •  nicotine (NICODERM CQ) 14 MG/24HR patch 1 patch, 1 patch, Transdermal, Q24H, Fahad Camara MD, 1 patch at 02/21/17 0807  •  traZODone (DESYREL) tablet 100 mg, 100 mg, Oral, Nightly, Fahad Camara MD, 100 mg at 02/20/17 2053    Diagnoses/Assessment:   Active Problems:    Major neurocognitive disorder due to Alzheimer's disease, probable, with behavioral disturbance    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  2) Will continue to provide treatment with the unit milieu, activities, therapies and psychopharmacological management.  3) Patient to be placed on or continued on Q15 minute checks and Elopement, Aggression and Fall precautions.  4) Will continue medical management by Dr. [place].  5) Will order following labs: none  6) Will make the following medication changes: cont the trazodone, aricept, depakote and namenda  7) Will continue discharge planning as appropriate for patient. Potential discharge tomorrow.  8)  Psychotherapy provided: none Will plan to have family session tomorrow with wife and daughter.        Treatment plan and medication risks and benefits discussed with: Patient and Dr. Camara and unit . Report from assigned nursing staff row[place].    Stephania Lira, DIANA  02/21/17  12:37 PM

## 2017-02-21 NOTE — NURSING NOTE
"Behavior   Anxiety appears anxious  Depression 0  Pain 0  AVH no  S/I no  H/I no    Pt alert and oriented to self only.  Pt appears anxious and irritable.  Pt yelled out \"I don't understand what you are saying, I just need to find my shoes\" when this nurse was speaking with pt.  Pt taking medications as prescribed.        Intervention  Instructed in med usage and effects, encouraged to verbalize needs. Meds administered as ordered.  Pt encouraged to attend groups and follow treatment plan.          Response  Pt walked away from this nurse.          Plan  Continue to monitor for safety/  every 15 minute monitoring checks.  "

## 2017-02-21 NOTE — PLAN OF CARE
Problem: BH Overarching Goals  Goal: Adheres to Safety Considerations for Self and Others  Outcome: Ongoing (interventions implemented as appropriate)  Goal: Optimized Coping Skills in Response to Life Stressors  Outcome: Ongoing (interventions implemented as appropriate)  Goal: Develops/Participates in Therapeutic [place] to Support Successful Transition  Outcome: Ongoing (interventions implemented as appropriate)    Problem: Risk for Violence/Aggression Toward Others  Goal: Treatment Goal: Refrain from acts of violence/aggression during length of stay, and demonstrate improved impulse control at the time of discharge  Outcome: Ongoing (interventions implemented as appropriate)  Goal: Verbalize thoughts and feelings associated with:  Outcome: Ongoing (interventions implemented as appropriate)  Goal: Refrain from harming others  Outcome: Ongoing (interventions implemented as appropriate)  Goal: Refrain from destructive acts on the environment or property  Outcome: Ongoing (interventions implemented as appropriate)  Goal: Control angry outbursts  Outcome: Ongoing (interventions implemented as appropriate)  Goal: Attend and participate in unit activities, including therapeutic, recreational, and educational groups  Outcome: Ongoing (interventions implemented as appropriate)  Goal: Identify appropriate positive anger management techniques  Outcome: Ongoing (interventions implemented as appropriate)    Problem: Risk for Self Injury/Neglect  Goal: Treatment Goal: Remain safe during length of stay, learn and adopt new coping skills, and be free of self-injurious ideation, impulses and acts at the time of discharge  Outcome: Ongoing (interventions implemented as appropriate)  Goal: Verbalize thoughts and feelings associated with:  Outcome: Ongoing (interventions implemented as appropriate)  Goal: Refrain from harming self  Outcome: Ongoing (interventions implemented as appropriate)  Goal: Attend and participate in unit  activities, including therapeutic, recreational, and educational groups  Outcome: Ongoing (interventions implemented as appropriate)  Goal: Recognize maladaptive responses and adopt new coping mechanisms  Outcome: Ongoing (interventions implemented as appropriate)  Goal: Complete daily ADLs, including personal hygiene independently, as able  Outcome: Ongoing (interventions implemented as appropriate)    Problem: Fall Risk (Adult)  Goal: Identify Related Risk Factors and Signs and Symptoms  Outcome: Ongoing (interventions implemented as appropriate)  Goal: Absence of Falls  Outcome: Ongoing (interventions implemented as appropriate)

## 2017-02-21 NOTE — NURSING NOTE
"Behavior   Anxiety 0  Depression 0  Pain 0  AVH no  S/I no  H/I no    Patient has been up all pm in day area.  Noted another male peer has been \"taking care of him\" helping him with opening snacks, covering patient with blanket, coming and getting staff when patient needs something.  Patient remains confused, has outburst of cussing at times.  Granddaugh. Called to check on patient.  He ate hs snack, took hs meds without diff.        Intervention  Instructed in med usage and effects, encouraged to verbalize needs. Meds administered as ordered.          Response  Verbalized understanding           Plan  Continue to monitor for safety/  every 15 minute monitoring checks.    "

## 2017-02-21 NOTE — PLAN OF CARE
Problem: BH Patient Care Overview (Adult)  Goal: Plan of Care Review  Outcome: Ongoing (interventions implemented as appropriate)  Goal: Individualization and Mutuality  Outcome: Ongoing (interventions implemented as appropriate)  Goal: Discharge Needs Assessment  Outcome: Ongoing (interventions implemented as appropriate)    Problem: BH Overarching Goals  Goal: Adheres to Safety Considerations for Self and Others  Outcome: Ongoing (interventions implemented as appropriate)  Goal: Optimized Coping Skills in Response to Life Stressors  Outcome: Ongoing (interventions implemented as appropriate)  Goal: Develops/Participates in Therapeutic [place] to Support Successful Transition  Outcome: Ongoing (interventions implemented as appropriate)

## 2017-02-22 PROCEDURE — 99232 SBSQ HOSP IP/OBS MODERATE 35: CPT | Performed by: NURSE PRACTITIONER

## 2017-02-22 RX ADMIN — DIVALPROEX SODIUM 750 MG: 500 TABLET, FILM COATED, EXTENDED RELEASE ORAL at 20:50

## 2017-02-22 RX ADMIN — MEMANTINE 5 MG: 5 TABLET ORAL at 18:08

## 2017-02-22 RX ADMIN — NICOTINE 1 PATCH: 14 PATCH, EXTENDED RELEASE TRANSDERMAL at 08:35

## 2017-02-22 RX ADMIN — DONEPEZIL HYDROCHLORIDE 10 MG: 10 TABLET ORAL at 20:50

## 2017-02-22 RX ADMIN — TRAZODONE HYDROCHLORIDE 100 MG: 100 TABLET ORAL at 20:50

## 2017-02-22 RX ADMIN — MEMANTINE 5 MG: 5 TABLET ORAL at 08:34

## 2017-02-22 NOTE — PLAN OF CARE
Problem: BH Overarching Goals  Goal: Optimized Coping Skills in Response to Life Stressors  Outcome: Ongoing (interventions implemented as appropriate)  Goal: Develops/Participates in Therapeutic [place] to Support Successful Transition  Outcome: Ongoing (interventions implemented as appropriate)    Problem: Risk for Violence/Aggression Toward Others  Goal: Treatment Goal: Refrain from acts of violence/aggression during length of stay, and demonstrate improved impulse control at the time of discharge  Outcome: Ongoing (interventions implemented as appropriate)

## 2017-02-22 NOTE — NURSING NOTE
Behavior   Anxiety 0  Depression 0  Pain 0  AVH no  S/I no  H/I no    Pt confused, walking around the unit, pt gets frustrated easily but calms quickly and can be redirected without difficulty. Pt unable to rate anxiety, depression or participate fully in assessment related to confusion. Pt requires assist for some ADLs. Pt takes meds well.        Intervention  Reinforced with patient what medications he is taking,pt encouraged to verbalize needs. Meds administered as ordered.  Patient takes meds well.        Response  Attempts to reorient patient unsuccessful. Staff to be available as needed.          Plan  Continue to monitor for safety/  every 15 minute monitoring checks.

## 2017-02-22 NOTE — PLAN OF CARE
CSW received call from daughter, Sue, stating her van broke down and she cannot come for family session and to pick pt up today. Family to come to unit for session tomorrow and to take pt home. MD is aware.

## 2017-02-22 NOTE — PROGRESS NOTES
2/22/2017    Chief Complaint: dementia related behavioral issues    Subjective:  Patient is a 76 y.o. male who was hospitalized for dementia related behavioral issues.   Since yesterday the patient has been unchanged. Waiting for discharge.  Patient reports medications are effective.  Further history reported: no aggression or irritability today. Oriented to self only. Family could not come in today due to car problems. Will come to  [facility] tomorrow for discharge.    Objective     Vital Signs    Temp:  [97.4 °F (36.3 °C)-98.4 °F (36.9 °C)] 97.4 °F (36.3 °C)  Heart Rate:  [53-71] 71  Resp:  [18] 18  BP: (121-128)/(58-62) 126/62    Physical Exam:   General Appearance: alert, appears stated age and cooperative,  Hygiene: fair  Gait & Station: Normal  Musculoskeletal: No tremors or abnormal involuntary movements      Mental Status Exam:   Cooperation: Cooperative  Eye Contact: poor, downcast  Psychomotor Behavior: Calm, resting in bed more today.   Mood: confused  Affect: mood-congruent  Speech: mumbled, hard to understand at times.  Thought Process: Poverty of thought  Associations: loose  Thought Content: Normal  Suicidal: unable to assess due to confusion  Homicidal: unable to assess due to confusion  Hallucinations: unable to assess due to confusion  Delusion: unable to assess due to confusion  Cognitive Functioning: unable to think in abstract terms. No goal oriented  Consciousness: awake and alert   Orientation: to self  Reliability: poor  Insight: Poor  Judgement: Poor  Impulse Control: Poor    Lab Results (last 24 hours)     ** No results found for the last 24 hours. **        Imaging Results (last 24 hours)     ** No results found for the last 24 hours. **          Medicine:   Current Facility-Administered Medications:   •  acetaminophen (TYLENOL) tablet 650 mg, 650 mg, Oral, Q4H PRN, Fahad Camara MD  •  aluminum-magnesium hydroxide-simethicone (MAALOX/MYLANTA) suspension 30 mL, 30 mL, Oral,  Q6H PRN, Fahad Camara MD  •  CloNIDine (CATAPRES) tablet 0.1 mg, 0.1 mg, Oral, Q4H PRN, Fahad Camara MD  •  divalproex (DEPAKOTE) 24 hr tablet 750 mg, 750 mg, Oral, Nightly, Fahad Camara MD, 750 mg at 02/21/17 2009  •  docusate sodium (COLACE) capsule 100 mg, 100 mg, Oral, BID PRN, Fahad Camara MD  •  donepezil (ARICEPT) tablet 10 mg, 10 mg, Oral, Nightly, Fahad Camara MD, 10 mg at 02/21/17 2009  •  hydrOXYzine (VISTARIL) capsule 50 mg, 50 mg, Oral, Q6H PRN, Fahad Camara MD, 50 mg at 02/19/17 2010  •  loperamide (IMODIUM) capsule 2 mg, 2 mg, Oral, 4x Daily PRN, Fahad Camara MD  •  magnesium hydroxide (MILK OF MAGNESIA) suspension 2400 mg/10mL 10 mL, 10 mL, Oral, Daily PRN, Fahad Camara MD  •  memantine (NAMENDA) tablet 5 mg, 5 mg, Oral, BID, Fahad Camara MD, 5 mg at 02/22/17 0834  •  nicotine (NICODERM CQ) 14 MG/24HR patch 1 patch, 1 patch, Transdermal, Q24H, Fahad Camara MD, 1 patch at 02/22/17 0835  •  traZODone (DESYREL) tablet 100 mg, 100 mg, Oral, Nightly, Fahad Camara MD, 100 mg at 02/21/17 2009    Diagnoses/Assessment:   Active Problems:    Major neurocognitive disorder due to Alzheimer's disease, probable, with behavioral disturbance    Bipolar and related disorder due to another medical condition with mixed features      Treatment Plan:    1) Will continue care for the patient on the behavioral health unit at [facility] to ensure patient safety.  2) Will continue to provide treatment with the unit milieu, activities, therapies and psychopharmacological management.  3) Patient to be placed on or continued on Q15 minute checks and Elopement, Aggression and Fall precautions.  4) Will continue medical management by Dr. Villeda.  5) Will order following labs: none  6) Will make the following medication changes: cont the trazodone, aricept, depakote and namenda  7) Will continue discharge planning as appropriate for patient.  Discharge tomorrow.  8) Psychotherapy provided: none Will plan to have family session tomorrow with wife and daughter.   Treatment plan and medication risks and benefits discussed with: Dr. Camara and unit     Stephania Lira, DIANA  02/22/17  3:05 PM

## 2017-02-22 NOTE — NURSING NOTE
Behavior   Anxiety 0  Depression 0  Pain 0  AVH no  S/I no  H/I no    PT IS SLEEPING. EARLIER HE WAS COMPLIANT WITH TAKING HS MEDS, AND FOLLOWING DIRECTIONS.        Intervention  Instructed in med usage and effects, encouraged to verbalize needs. Meds administered as ordered.          Response  Verbalized understanding           Plan  Continue to monitor for safety/  every 15 minute monitoring checks.

## 2017-02-23 VITALS
RESPIRATION RATE: 18 BRPM | WEIGHT: 134.92 LBS | DIASTOLIC BLOOD PRESSURE: 66 MMHG | SYSTOLIC BLOOD PRESSURE: 137 MMHG | OXYGEN SATURATION: 94 % | HEIGHT: 68 IN | BODY MASS INDEX: 20.45 KG/M2 | HEART RATE: 61 BPM | TEMPERATURE: 96.6 F

## 2017-02-23 PROCEDURE — 99238 HOSP IP/OBS DSCHRG MGMT 30/<: CPT | Performed by: NURSE PRACTITIONER

## 2017-02-23 RX ORDER — DONEPEZIL HYDROCHLORIDE 10 MG/1
10 TABLET, FILM COATED ORAL NIGHTLY
Qty: 30 TABLET | Refills: 0 | Status: ON HOLD | OUTPATIENT
Start: 2017-02-23 | End: 2017-03-09

## 2017-02-23 RX ORDER — TRAZODONE HYDROCHLORIDE 100 MG/1
100 TABLET ORAL NIGHTLY
Qty: 30 TABLET | Refills: 0 | Status: ON HOLD | OUTPATIENT
Start: 2017-02-23 | End: 2017-03-09

## 2017-02-23 RX ORDER — DIVALPROEX SODIUM 250 MG/1
750 TABLET, EXTENDED RELEASE ORAL NIGHTLY
Qty: 90 TABLET | Refills: 0 | Status: ON HOLD | OUTPATIENT
Start: 2017-02-23 | End: 2017-03-09

## 2017-02-23 RX ADMIN — NICOTINE 1 PATCH: 14 PATCH, EXTENDED RELEASE TRANSDERMAL at 08:00

## 2017-02-23 RX ADMIN — MEMANTINE 5 MG: 5 TABLET ORAL at 08:00

## 2017-02-23 NOTE — NURSING NOTE
Behavior   Anxiety 0  Depression 0  Pain 0  AVH no  S/I no  H/I no     Pt is confused and disoriented. Pt gets frustrated quickly but is easy to calm and redirect. Pt takes his meds well.            Intervention  Reinforced medication information and pt  encouraged to verbalize needs. Meds administered as ordered.          Response  Pt calm and easily redirected.           Plan  Continue to monitor for safety/  every 15 minute monitoring checks. Staff to be available as needed.

## 2017-02-23 NOTE — PLAN OF CARE
Problem: BH Patient Care Overview (Adult)  Goal: Plan of Care Review  Outcome: Ongoing (interventions implemented as appropriate)    Problem: BH Overarching Goals  Goal: Adheres to Safety Considerations for Self and Others  Outcome: Ongoing (interventions implemented as appropriate)  Goal: Optimized Coping Skills in Response to Life Stressors  Outcome: Ongoing (interventions implemented as appropriate)  Goal: Develops/Participates in Therapeutic [place] to Support Successful Transition  Outcome: Ongoing (interventions implemented as appropriate)    Problem: Risk for Violence/Aggression Toward Others  Goal: Treatment Goal: Refrain from acts of violence/aggression during length of stay, and demonstrate improved impulse control at the time of discharge  Outcome: Ongoing (interventions implemented as appropriate)  Goal: Verbalize thoughts and feelings associated with:  Outcome: Ongoing (interventions implemented as appropriate)  Goal: Refrain from harming others  Outcome: Ongoing (interventions implemented as appropriate)  Goal: Refrain from destructive acts on the environment or property  Outcome: Ongoing (interventions implemented as appropriate)  Goal: Control angry outbursts  Outcome: Ongoing (interventions implemented as appropriate)  Goal: Attend and participate in unit activities, including therapeutic, recreational, and educational groups  Outcome: Ongoing (interventions implemented as appropriate)  Goal: Identify appropriate positive anger management techniques  Outcome: Ongoing (interventions implemented as appropriate)    Problem: Risk for Self Injury/Neglect  Goal: Treatment Goal: Remain safe during length of stay, learn and adopt new coping skills, and be free of self-injurious ideation, impulses and acts at the time of discharge  Outcome: Ongoing (interventions implemented as appropriate)  Goal: Verbalize thoughts and feelings associated with:  Outcome: Ongoing (interventions implemented as  appropriate)  Goal: Refrain from harming self  Outcome: Ongoing (interventions implemented as appropriate)  Goal: Attend and participate in unit activities, including therapeutic, recreational, and educational groups  Outcome: Ongoing (interventions implemented as appropriate)  Goal: Recognize maladaptive responses and adopt new coping mechanisms  Outcome: Ongoing (interventions implemented as appropriate)  Goal: Complete daily ADLs, including personal hygiene independently, as able  Outcome: Ongoing (interventions implemented as appropriate)    Problem: Fall Risk (Adult)  Goal: Identify Related Risk Factors and Signs and Symptoms  Outcome: Ongoing (interventions implemented as appropriate)  Goal: Absence of Falls  Outcome: Ongoing (interventions implemented as appropriate)

## 2017-02-23 NOTE — NURSING NOTE
Pt ambulated from [facility] for discharge home. Pt stable, no s/s of distress noted. All discharge paperwork, follow up appointments and medications reviewed with the family and patient. Family denies any questions/concerns. Prescriptions sent electronically to [facility] in [place]. All of patient's personal belongings sent with patient.

## 2017-02-23 NOTE — PLAN OF CARE
Problem: BH Patient Care Overview (Adult)  Goal: Plan of Care Review  Outcome: Ongoing (interventions implemented as appropriate)    02/23/17 0454   Coping/Psychosocial Response Interventions   Plan Of Care Reviewed With patient   Coping/Psychosocial   Patient Agreement with Plan of Care unable to participate   Patient Care Overview   Progress improving       Goal: Individualization and Mutuality  Outcome: Ongoing (interventions implemented as appropriate)  Goal: Discharge Needs Assessment  Outcome: Ongoing (interventions implemented as appropriate)    Problem:  Overarching Goals  Goal: Adheres to Safety Considerations for Self and Others  Outcome: Ongoing (interventions implemented as appropriate)  Goal: Optimized Coping Skills in Response to Life Stressors  Outcome: Ongoing (interventions implemented as appropriate)  Goal: Develops/Participates in Therapeutic [place] to Support Successful Transition  Outcome: Ongoing (interventions implemented as appropriate)

## 2017-02-23 NOTE — DISCHARGE SUMMARY
"    Admission Date: 2/10/2017    Discharge Date: 2/23/2017    Psychiatric History:  Patient is a 76 y.o. male who presented with dementia related behavioral issues and physical aggression. Onset of symptoms was abrupt starting 5 days prior to admission. Symptoms had been present on a constant basis. Symptoms are associated with insomnia, depressed mood and irritability. Symptoms were aggravated by unclear factors.  Patients symptoms were severe. Patient's symptoms occur in the context of dementia, recent move and other social issues. Prior to admission Naresh was at home with adoptive daughter and his wife. He was violent and cursing and threatening to break her arm and threatened to kill himself. He pulled light fixture out of the ceiling. His sleep has been poor for the last month. He was started on dementia meds about 3 months ago by PCP. He has been more confused and could not remember things. \"He kept thinking we were some place else.\" Things started day after move to [place].       Diagnostic Data:    Diagnostic Studies  CBC, CMP,TSH, UDS, acetaminophen level, salicylate level, ethanol level, U/A all normal except      MCHC is slightly low at 33 but H&H is normal at 14.6 over 44.2. Creatinine is 1.08, sodium slightly low at 132 and alkaline phosphatase slightly elevated at 155. Ethanol is less than 10 troponin is less than 0.012 urine drug screen is all negative and urinalysis is unremarkable.     Non-contrasted CT of the head shows moderate cortical atrophy with ventricular dilatation without acute change. Chest x-ray shows mild interstitial prominence in the mid and lower lungs bilaterally. There is no focal infiltrate.    57.4 Depakote level on 2.16.17       Summary of Hospital Course:  Patient was admitted to the behavioral health unit at [facility] to ensure patient safety.  Patient was provided treatment with the unit milieu, activities, therapies and psychopharmacological " management.  Patient was placed on Q15 minute checks and Elopement, Aggression and Fall.  Dr. Villeda's consult was consulted for management of medical co-morbidities.  Patient was restarted on the following psychiatric medications: Namenda, and Aricept.  The following medication changes were made during the hospital stay: Depakote was started and increased to 750 mg daily.  Patient had improvement over the course of the hospital stay and tolerated medications.  Patient had family session with daughter and wife.  Substance abuse issues were not present.    Patients Condition at Discharge:  Patient is stable for discharge and is not an imminent threat to self or others.  The patient's behavior was Restless.  Patient reported that mood was confused.  Patient's affect was blunted.  Patient's thought content was as follows:   Suicidal:  None   Homicidal:  None   Hallucinations:  None   Delusion:  None    Discharge Diagnosis:  Active Problems:    Major neurocognitive disorder due to Alzheimer's disease, probable, with behavioral disturbance    Bipolar and related disorder due to another medical condition with mixed features      Discharge Medications:      Your medication list      START taking these medications       Instructions Last Dose Given Next Dose Due    divalproex 250 MG 24 hr tablet   Commonly known as:  DEPAKOTE        Take 3 tablets by mouth Every Night.           CHANGE how you take these medications       Instructions Last Dose Given Next Dose Due    donepezil 10 MG tablet   Commonly known as:  ARICEPT   What changed:    - medication strength  - how much to take        Take 1 tablet by mouth Every Night.         traZODone 100 MG tablet   Commonly known as:  DESYREL   What changed:    - medication strength  - how much to take        Take 1 tablet by mouth Every Night.           CONTINUE taking these medications       Instructions Last Dose Given Next Dose Due    ibuprofen 600 MG tablet   Commonly known as:   NOELLE BENOIT              memantine 5 MG tablet   Commonly known as:  NAMENDA                   Where to Get Your Medications      These medications were sent to [facility] [address] [place], [place] - [address] - [phone]  - [phone]   [address], [facility] [address]     Phone:  [phone]    • divalproex 250 MG 24 hr tablet   • donepezil 10 MG tablet   • traZODone 100 MG tablet             Justification for multiple antipsychotic medications at discharge:  Not Applicable.    Disposition: Patient was discharged home with family.  Psychiatric follow up will be with primary care Dr. Christianson.  Medical follow up will be with primary care physician.  Your Follow-Up Providers      Follow up with Dr. Christianson-[facility]. Go on 3/2/2017.     Follow up details: Please arrive at 2:15     Take ID, Ins Card, SS  Card, and Med bottles to follow up appt.     Contact information:     [address] [place] [address]    [place]   [phone]

## 2017-02-23 NOTE — NURSING NOTE
Behavior   Anxiety 2  Depression 0  Pain 0  AVH no  S/I no  H/I no   Pt. Asking why his daughter did not come to get him. Pt. Calm & maintained fair eye contact. No behaviors noted. Pt. Refused to change out of clothes into night clothes. Assisted to bed.    Intervention  Instructed in med usage and effects, encouraged to verbalize needs. Meds administered as ordered.    Response  Unable to understand.  Plan  Continue to monitor for safety/  every 15 minute monitoring checks.

## 2017-02-23 NOTE — SIGNIFICANT NOTE
02/23/17 1131   Family Counseling   Time Session Began 1100   Time Session Ended 1130   Total Time (minutes) 30   Participants daughter;spouse   Topic Safety/DC Plan   Session Detail CSW met with pt's spouse and daughter to review discharge plan and review safety concerns. CSW scheduled pt with follow up at Dr Christianson's office at [facility] in [place]. CSW educated pt on Crisis Hotline, advised them to call as needed. PGDRS was complete and family given Press Ganey to complete and return after dc.   Patient Response Pt's family were cooperative and voiced understanding of dc plan. Family was quite adament that they wanted to bring pt home instead of looking into a skilled nursing fa cility. CSW did provide family with education on dementia and various techniques that could be helpful while dealing with pt. Family had no questions or concerns. Plan to dc today.

## 2017-03-01 ENCOUNTER — HOSPITAL ENCOUNTER (INPATIENT)
Facility: HOSPITAL | Age: 77
LOS: 8 days | Discharge: NURSING, REHAB OR LONG-TERM CARE | End: 2017-03-09
Attending: PSYCHIATRY & NEUROLOGY | Admitting: PSYCHIATRY & NEUROLOGY

## 2017-03-01 DIAGNOSIS — Z78.9 IMPAIRED MOBILITY AND ADLS: ICD-10-CM

## 2017-03-01 DIAGNOSIS — Z74.09 IMPAIRED MOBILITY AND ADLS: ICD-10-CM

## 2017-03-01 DIAGNOSIS — F06.34 BIPOLAR AND RELATED DISORDER DUE TO ANOTHER MEDICAL CONDITION WITH MIXED FEATURES: ICD-10-CM

## 2017-03-01 PROBLEM — F03.918 DEMENTIA WITH BEHAVIORAL DISTURBANCE (HCC): Status: ACTIVE | Noted: 2017-03-01

## 2017-03-01 RX ORDER — MAGNESIUM HYDROXIDE/ALUMINUM HYDROXICE/SIMETHICONE 120; 1200; 1200 MG/30ML; MG/30ML; MG/30ML
30 SUSPENSION ORAL EVERY 6 HOURS PRN
Status: DISCONTINUED | OUTPATIENT
Start: 2017-03-01 | End: 2017-03-09 | Stop reason: HOSPADM

## 2017-03-01 RX ORDER — IBUPROFEN 400 MG/1
400 TABLET ORAL
Status: DISCONTINUED | OUTPATIENT
Start: 2017-03-01 | End: 2017-03-09 | Stop reason: HOSPADM

## 2017-03-01 RX ORDER — DOCUSATE SODIUM 100 MG/1
100 CAPSULE, LIQUID FILLED ORAL 2 TIMES DAILY PRN
Status: DISCONTINUED | OUTPATIENT
Start: 2017-03-01 | End: 2017-03-09 | Stop reason: HOSPADM

## 2017-03-01 RX ORDER — CLONIDINE HYDROCHLORIDE 0.1 MG/1
0.1 TABLET ORAL EVERY 4 HOURS PRN
Status: DISCONTINUED | OUTPATIENT
Start: 2017-03-01 | End: 2017-03-09 | Stop reason: HOSPADM

## 2017-03-01 RX ORDER — ACETAMINOPHEN 325 MG/1
650 TABLET ORAL EVERY 4 HOURS PRN
Status: DISCONTINUED | OUTPATIENT
Start: 2017-03-01 | End: 2017-03-09 | Stop reason: HOSPADM

## 2017-03-01 RX ORDER — HYDROXYZINE PAMOATE 50 MG/1
50 CAPSULE ORAL EVERY 6 HOURS PRN
Status: DISCONTINUED | OUTPATIENT
Start: 2017-03-01 | End: 2017-03-09 | Stop reason: HOSPADM

## 2017-03-01 RX ORDER — LOPERAMIDE HYDROCHLORIDE 2 MG/1
2 CAPSULE ORAL 4 TIMES DAILY PRN
Status: DISCONTINUED | OUTPATIENT
Start: 2017-03-01 | End: 2017-03-09 | Stop reason: HOSPADM

## 2017-03-01 RX ORDER — MEMANTINE HYDROCHLORIDE 5 MG/1
5 TABLET ORAL 2 TIMES DAILY
Status: DISCONTINUED | OUTPATIENT
Start: 2017-03-01 | End: 2017-03-09 | Stop reason: HOSPADM

## 2017-03-01 RX ORDER — TRAZODONE HYDROCHLORIDE 100 MG/1
100 TABLET ORAL NIGHTLY
Status: DISCONTINUED | OUTPATIENT
Start: 2017-03-01 | End: 2017-03-09 | Stop reason: HOSPADM

## 2017-03-01 RX ORDER — DONEPEZIL HYDROCHLORIDE 10 MG/1
10 TABLET, FILM COATED ORAL NIGHTLY
Status: DISCONTINUED | OUTPATIENT
Start: 2017-03-01 | End: 2017-03-09 | Stop reason: HOSPADM

## 2017-03-01 RX ADMIN — MEMANTINE 5 MG: 5 TABLET ORAL at 18:40

## 2017-03-01 RX ADMIN — TRAZODONE HYDROCHLORIDE 100 MG: 100 TABLET ORAL at 20:32

## 2017-03-01 RX ADMIN — DONEPEZIL HYDROCHLORIDE 10 MG: 10 TABLET ORAL at 20:32

## 2017-03-01 RX ADMIN — IBUPROFEN 400 MG: 400 TABLET, FILM COATED ORAL at 18:40

## 2017-03-01 RX ADMIN — DIVALPROEX SODIUM 750 MG: 500 TABLET, FILM COATED, EXTENDED RELEASE ORAL at 20:32

## 2017-03-02 PROBLEM — F03.918 DEMENTIA WITH BEHAVIORAL DISTURBANCE (HCC): Status: RESOLVED | Noted: 2017-03-01 | Resolved: 2017-03-02

## 2017-03-02 PROCEDURE — 97162 PT EVAL MOD COMPLEX 30 MIN: CPT

## 2017-03-02 PROCEDURE — 97165 OT EVAL LOW COMPLEX 30 MIN: CPT

## 2017-03-02 PROCEDURE — 99222 1ST HOSP IP/OBS MODERATE 55: CPT | Performed by: FAMILY MEDICINE

## 2017-03-02 PROCEDURE — 90791 PSYCH DIAGNOSTIC EVALUATION: CPT | Performed by: PSYCHIATRY & NEUROLOGY

## 2017-03-02 RX ADMIN — TRAZODONE HYDROCHLORIDE 100 MG: 100 TABLET ORAL at 20:30

## 2017-03-02 RX ADMIN — MEMANTINE 5 MG: 5 TABLET ORAL at 18:20

## 2017-03-02 RX ADMIN — DIVALPROEX SODIUM 750 MG: 500 TABLET, FILM COATED, EXTENDED RELEASE ORAL at 20:30

## 2017-03-02 RX ADMIN — DONEPEZIL HYDROCHLORIDE 10 MG: 10 TABLET ORAL at 20:30

## 2017-03-02 RX ADMIN — IBUPROFEN 400 MG: 400 TABLET, FILM COATED ORAL at 18:19

## 2017-03-02 RX ADMIN — IBUPROFEN 400 MG: 400 TABLET, FILM COATED ORAL at 08:12

## 2017-03-02 RX ADMIN — IBUPROFEN 400 MG: 400 TABLET, FILM COATED ORAL at 12:07

## 2017-03-02 RX ADMIN — MEMANTINE 5 MG: 5 TABLET ORAL at 08:12

## 2017-03-02 NOTE — PLAN OF CARE
Problem: BH Patient Care Overview (Adult)  Goal: Interdisciplinary Rounds/Family Conference  Outcome: Ongoing (interventions implemented as appropriate)  Goal: Individualization and Mutuality  Outcome: Ongoing (interventions implemented as appropriate)  Goal: Discharge Needs Assessment  Outcome: Ongoing (interventions implemented as appropriate)

## 2017-03-02 NOTE — THERAPY DISCHARGE NOTE
Acute Care - Physical Therapy Initial Eval/Discharge  [facility]     Patient Name: Naresh Saunders  : 1940  MRN: [id number]  Today's Date: 3/2/2017      Date of Referral to PT: 17         Admit Date: 3/1/2017    Visit Dx:    ICD-10-CM ICD-9-CM   1. Impaired mobility and ADLs Z74.09 799.89   2. Bipolar and related disorder due to another medical condition with mixed features F06.34 293.83     Patient Active Problem List   Diagnosis   • Major neurocognitive disorder due to Alzheimer's disease, probable, with behavioral disturbance   • Bipolar and related disorder due to another medical condition with mixed features   • Dementia with behavioral disturbance     History reviewed. No pertinent past medical history.  Past Surgical History   Procedure Laterality Date   • Tonsillectomy     • Adenoidectomy Bilateral           PT ASSESSMENT (last 72 hours)      PT Evaluation       17 1427 17 1426    Rehab Evaluation    Document Type evaluation;discharge summary  -MM evaluation;discharge summary  -LW    Subjective Information no complaints;agree to therapy  -MM no complaints;agree to therapy  -LW    Patient Effort, Rehab Treatment adequate  -MM adequate  -LW    Symptoms Noted During/After Treatment none  -MM none  -LW    General Information    Patient Profile Review yes  -MM yes  -LW    General Observations Pt supine in bed upon arrival. RN agreeable to PT session.   -MM The pt was supine upon arrival and agreeable to OT eval. RN aware of session.  -LW    Precautions/Limitations fall precautions  -MM fall precautions  -LW    Prior Level of Function independent:;all household mobility  -MM independent:;all household mobility;ADL's   A with all IADls  -LW    Equipment Currently Used at Home none  -MM none  -LW    Plans/Goals Discussed With  patient  -LW    Risks Reviewed  patient:  -LW    Benefits Reviewed  patient:  -LW    Barriers to Rehab cognitive status  -MM cognitive status  -LW    Living  Environment    Lives With child(brian), adult  -MM child(brian), adult;spouse   dtr  -LW    Living Arrangements house  -MM house  -LW    Transportation Available  family or friend will provide  -LW    Living Environment Comment Pt poor historian--PLOF obtained from chart review.   -MM The pt is a poor historian and no family was present during eval. Per chart review the pt resides with family.   -LW    Clinical Impression    Date of Referral to PT 03/02/17  -MM     Functional Level At Time Of Evaluation supervision for all mobility   -MM     Criteria for Skilled Therapeutic Interventions Met no;current level of function same as previous level of function;no problems identified which require skilled intervention;no significant expected improvement in functional status  -MM     Pain Assessment    Pain Assessment No/denies pain  -MM No/denies pain  -LW    Vision Assessment/Intervention    Visual Impairment WNL  -MM WNL  -LW    Cognitive Assessment/Intervention    Current Cognitive/Communication Assessment impaired  -MM impaired  -LW    Orientation Status oriented to;person   despite verbal cueing--unable to choose location or year  -MM oriented to;person;required verbal cueing (specifiy in comments)   unable to choose correct location or year despite cues  -LW    Follows Commands/Answers Questions 75% of the time;able to follow single-step instructions;needs cueing;needs increased time  -MM 75% of the time;needs increased time;needs repetition  -LW    Personal Safety decreased awareness, need for safety;decreased insight to deficits;impulsive;one on one supervision required for safety;unaware of cognitive deficits;unaware of consequences of deficits;unaware of functional deficits  -MM decreased awareness, need for assist;decreased awareness, need for safety;decreased insight to deficits;unaware of cognitive deficits;unaware of consequences of deficits;unaware of functional deficits  -LW    Personal Safety Interventions  fall prevention program maintained;supervised activity;nonskid shoes/slippers when out of bed  -MM fall prevention program maintained;nonskid shoes/slippers when out of bed  -LW    ROM (Range of Motion)    General ROM no range of motion deficits identified  -MM no range of motion deficits identified  -LW    MMT (Manual Muscle Testing)    General MMT Assessment no strength deficits identified  -MM no strength deficits identified  -LW    General MMT Assessment Detail  grossly 3+/5  -LW    Bed Mobility, Assessment/Treatment    Bed Mobility, Roll Left, [place] supervision required  -MM supervision required  -LW    Bed Mobility, Scoot/Bridge, [place] supervision required  -MM supervision required;verbal cues required  -LW    Bed Mob, Supine to Sit, [place] supervision required  -MM supervision required;verbal cues required  -LW    Bed Mob, Sit to Supine, [place] supervision required  -MM supervision required  -LW    Transfer Assessment/Treatment    Transfers, Sit-Stand [place] supervision required  -MM supervision required;verbal cues required  -LW    Transfers, Stand-Sit [place] supervision required  -MM supervision required  -LW    Toilet Transfer, [place]  supervision required  -LW    Transfer, Comment Pt with forward flexed hip/knees upon coming to stand--appears to be chronic and at functional baseline  -MM     Gait Assessment/Treatment    Gait, [place] Level supervision required  -MM     Gait, Distance (Feet) 45  -MM     Gait, Comment Pt ambulated throughout room and into hallway with shuffling gait pattern---no LOB or A needed, appears to be at functional baseline   -MM     Positioning and Restraints    Pre-Treatment Position in bed  -MM in bed  -LW    Post Treatment Position bed  -MM bed  -LW    In Bed supine;notified nsg  -MM notified nsg;supine  -LW      03/01/17 2142 03/01/17 2100    General Information    Equipment Currently Used at Home none  -JS none  -JS    Living  Environment    Lives With  spouse;child(brian), adult  -JS    Living Arrangements  house  -JS    Home Accessibility  no concerns  -JS    Stair Railings at Home  none  -JS    Type of Financial/Environmental Concern  none  -JS    Transportation Available family or friend will provide  -JS family or friend will provide  -JS      User Key  (r) = Recorded By, (t) = Taken By, (c) = Cosigned By    Initials Name Provider Type     Enedina Da Silva, RN Registered Nurse    JUAN Chin, OT Occupational Therapist    DEMETRICE Martinez PT Physical Therapist              PT Recommendation and Plan  Anticipated Discharge Disposition: skilled nursing facility  PT Frequency: evaluation only  Plan of Care Review  Plan Of Care Reviewed With: patient  Outcome Summary/Follow up Plan: PT eval completed. Pt completed all mobility with supervision--appearing to be at functional baseline. No skilled PT services needed at this time--pt will need 24/7 care upon d/c due to cognitive and safety impairments. PT will sign off.              Time Calculation:         PT Charges       03/02/17 1555          Time Calculation    Start Time 1428  -MM      Stop Time 1450  -MM      Time Calculation (min) 22 min  -MM      PT Received On 03/02/17  -MM        User Key  (r) = Recorded By, (t) = Taken By, (c) = Cosigned By    Initials Name Provider Type    DEMETRICE Martinez PT Physical Therapist          Therapy Charges for Today     Code Description Service Date Service Provider Modifiers Qty    13597116676  PT EVAL MOD COMPLEXITY 1 3/2/2017 Flower Martinez PT GP 1               PT Discharge Summary  Anticipated Discharge Disposition: skilled nursing facility  Reason for Discharge: At baseline function, Maximum functional potential achieved  Discharge Destination: SNF    Flower Martinez PT  3/2/2017

## 2017-03-02 NOTE — SIGNIFICANT NOTE
03/02/17 0837   Individual Counseling   Time Session Began 810   Time Session Ended 840   Total Time (minutes) 30   Topic Initial Assessment   Session Detail CSW met with pt 1:1 and spoke with bola via telephone and completed psychosocial assessment and PGDRS.   Patient Response Pt was in room, cooperative with CSW. Pt was alert and oriented to self only. Pt was unable to give any hx or relative information. Pt was not aware that he was recently hospitalized at [facility]. CSW spoke with daughter and reviewed information on psychosocial assessment that was completed during admission to this unit on 2/10/17. Pt was discharged home with daughter and spouse and family states that they were unable to manage patient at home. Pt was easily agitated and threatening to become aggressive. Family is requesting that pt be placed in a SNF. CSW received consent to fax referrals to SNFs in [facility]. MD to meet with pt and make med adjustments as needed. CSW to continue to follow up with family re: disposition.

## 2017-03-02 NOTE — THERAPY DISCHARGE NOTE
Acute Care - Occupational Therapy Initial Eval/Discharge  [facility]     Patient Name: Naresh Saunders  : 1940  MRN: [id number]  Today's Date: 3/2/2017     Date of Referral to OT: 17         Admit Date: 3/1/2017       ICD-10-CM ICD-9-CM   1. Impaired mobility and ADLs Z74.09 799.89     Patient Active Problem List   Diagnosis   • Major neurocognitive disorder due to Alzheimer's disease, probable, with behavioral disturbance   • Bipolar and related disorder due to another medical condition with mixed features   • Dementia with behavioral disturbance     No past medical history on file.  Past Surgical History   Procedure Laterality Date   • Tonsillectomy     • Adenoidectomy Bilateral           OT ASSESSMENT FLOWSHEET (last 72 hours)      OT Evaluation       17 1426 17 2142 17 2100          Rehab Evaluation    Document Type evaluation;discharge summary  -LW        Subjective Information no complaints;agree to therapy  -LW        Patient Effort, Rehab Treatment adequate  -LW        Symptoms Noted During/After Treatment none  -LW        General Information    Patient Profile Review yes  -LW        General Observations The pt was supine upon arrival and agreeable to OT eval. RN aware of session.  -LW        Precautions/Limitations fall precautions  -LW        Prior Level of Function independent:;all household mobility;ADL's   A with all IADls  -LW        Equipment Currently Used at Home none  -LW none  -JS none  -JS      Plans/Goals Discussed With patient  -LW        Risks Reviewed patient:  -LW        Benefits Reviewed patient:  -LW        Barriers to Rehab cognitive status  -LW        Living Environment    Lives With child(brian), adult;spouse   dtr  -LW  spouse;child(brian), adult  -JS      Living Arrangements house  -LW  house  -JS      Home Accessibility   no concerns  -JS      Stair Railings at Home   none  -JS      Type of Financial/Environmental Concern   none  -JS       Transportation Available family or friend will provide  -LW family or friend will provide  -JS family or friend will provide  -JS      Living Environment Comment The pt is a poor historian and no family was present during eval. Per chart review the pt resides with family.   -LW        Clinical Impression    Date of Referral to OT 03/02/17  -LW        OT Diagnosis Impaired functional mob and ADls - due to Dementia with Behavioral disturbances  -LW        Functional Level At Time Of Evaluation The pt appears to be at baseline status  -LW        Criteria for Skilled Therapeutic Interventions Met no problems identified which require skilled intervention;current level of function same as previous level of function;no significant expected improvement in functional status  -LW        Therapy Frequency evaluation only  -LW        Anticipated Discharge Disposition home with 24/7 care;placement for care  -LW        Functional Level Prior    Ambulation 0-->independent  -LW  0-->independent  -JS      Transferring 0-->independent  -LW  0-->independent  -JS      Toileting 0-->independent  -LW  0-->independent  -JS      Bathing 0-->independent  -LW  2-->assistive person  -JS      Dressing 0-->independent  -LW  2-->assistive person  -JS      Eating 0-->independent  -LW  0-->independent  -JS      Communication 0-->understands/communicates without difficulty  -LW  2-->difficulty understanding (not related to language barrier)  -JS      Swallowing 0-->swallows foods/liquids without difficulty  -LW  0-->swallows foods/liquids without difficulty  -JS      Pain Assessment    Pain Assessment No/denies pain  -LW        Vision Assessment/Intervention    Visual Impairment WNL  -LW        Cognitive Assessment/Intervention    Current Cognitive/Communication Assessment impaired  -LW        Orientation Status oriented to;person;required verbal cueing (specifiy in comments)   unable to choose correct location or year despite cues  -LW         Follows Commands/Answers Questions 75% of the time;needs increased time;needs repetition  -LW        Personal Safety decreased awareness, need for assist;decreased awareness, need for safety;decreased insight to deficits;unaware of cognitive deficits;unaware of consequences of deficits;unaware of functional deficits  -LW        Personal Safety Interventions fall prevention program maintained;nonskid shoes/slippers when out of bed  -LW        ROM (Range of Motion)    General ROM no range of motion deficits identified  -LW        MMT (Manual Muscle Testing)    General MMT Assessment no strength deficits identified  -LW        General MMT Assessment Detail grossly 3+/5  -LW        Bed Mobility, Assessment/Treatment    Bed Mobility, Roll Left, [place] supervision required  -LW        Bed Mobility, Scoot/Bridge, [place] supervision required;verbal cues required  -LW        Bed Mob, Supine to Sit, [place] supervision required;verbal cues required  -LW        Bed Mob, Sit to Supine, [place] supervision required  -LW        Transfer Assessment/Treatment    Transfers, Sit-Stand [place] supervision required;verbal cues required  -LW        Transfers, Stand-Sit [place] supervision required  -LW        Toilet Transfer, [place] supervision required  -LW        Lower Body Dressing Assessment/Training    LB Dressing Assess/Train, Clothing Type doffing:;donning:;slipper socks  -LW        LB Dressing Assess/Train, Position edge of bed;sitting  -LW        Positioning and Restraints    Pre-Treatment Position in bed  -LW        Post Treatment Position bed  -LW        In Bed notified nsg;supine  -LW          User Key  (r) = Recorded By, (t) = Taken By, (c) = Cosigned By    Initials Name Effective Dates    RENETTA Da Silva RN 10/17/16 -     LW Pauly Chin OT 12/19/16 -           Occupational Therapy Education     Title: PT OT SLP Therapies (Resolved)     Topic: Occupational Therapy  (Resolved)     Point: ADL training (Resolved)    Description: Instruct learner(s) on proper safety adaptation and remediation techniques during self care or transfers.   Instruct in proper use of assistive devices.    Learning Progress Summary    Learner Readiness Method Response Comment Documented by Status   Patient Acceptance D NR  LW 03/02/17 1514 Active               Point: Precautions (Resolved)    Description: Instruct learner(s) on prescribed precautions during self-care and functional transfers.    Learning Progress Summary    Learner Readiness Method Response Comment Documented by Status   Patient Acceptance D NR  LW 03/02/17 1514 Active               Point: Body mechanics (Resolved)    Description: Instruct learner(s) on proper positioning and spine alignment during self-care, functional mobility activities and/or exercises.    Learning Progress Summary    Learner Readiness Method Response Comment Documented by Status   Patient Acceptance D NR  LW 03/02/17 1514 Active                      User Key     Initials Effective Dates Name Provider Type Discipline     12/19/16 -  Pauly Chin OT Occupational Therapist OT                OT Recommendation and Plan  Anticipated Discharge Disposition: home with 24/7 care, placement for care  Therapy Frequency: evaluation only  Plan of Care Review  Plan Of Care Reviewed With: patient  Outcome Summary/Follow up Plan: OT eval completed and the pt appears to be at functional baseline at this time and in need of 24/7 care at discharge due to cognitive deficits and safety impairments. The pt has no skilled OT needs identified and OT will sign off.              Time Calculation:         Time Calculation- OT       03/02/17 1517          Time Calculation- OT    OT Start Time 1426  -LW      OT Stop Time 1445  -LW      OT Time Calculation (min) 19 min  -LW      OT Non-Billable Time (min) 19 min  -LW      OT Received On 03/02/17  -        User Key  (r) = Recorded By, (t) =  Taken By, (c) = Cosigned By    Initials Name Provider Type    LW Pauly Chin OT Occupational Therapist          Therapy Charges for Today     Code Description Service Date Service Provider Modifiers Qty    [id number] HC OT EVAL LOW COMPLEXITY 1 3/2/2017 Pauly Chin OT GO 1               OT Discharge Summary  Anticipated Discharge Disposition: home with 24/7 care, placement for care    Pauly Chin OT  3/2/2017

## 2017-03-02 NOTE — PLAN OF CARE
Problem: Patient Care Overview (Adult)  Goal: Plan of Care Review  Outcome: Ongoing (interventions implemented as appropriate)    03/02/17 0493   Coping/Psychosocial Response Interventions   Plan Of Care Reviewed With patient   Outcome Evaluation   Outcome Summary/Follow up Plan PT eval completed. Pt completed all mobility with supervision--appearing to be at functional baseline. No skilled PT services needed at this time--pt will need 24/7 care upon d/c due to cognitive and safety impairments. PT will sign off.
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
## 2017-03-02 NOTE — DISCHARGE PLACEMENT REQUEST
"Ngozi Vázquez (76 y.o. Male)     Date of Birth Social Security Number Address Home Phone MRN    1940  [id number] [facility] [phone] [id number]    [employer] Marital Status          None        Admission Date Admission Type Admitting Provider Attending Provider Department, Room/Bed    3/1/17 Elective Fahad Camara MD Abubucker, Shabeer, MD [facility] SENIOR PSYCH, 668/1    Discharge Date Discharge Disposition Discharge Destination                      Attending Provider: Fahad Camara MD     Allergies:  Penicillins    Isolation:  None   Infection:  None   Code Status:  FULL    Ht:  67\" (170.2 cm)   Wt:  124 lb 3.2 oz (56.3 kg)    Admission Cmt:  None   Principal Problem:  None                Active Insurance as of 3/1/2017     Primary Coverage     Payor Plan Insurance Group Employer/Plan Group    HUMANA MEDICARE REPL HUMANA MEDICARE REPL [id number]     Payor Plan Address Payor Plan Phone Number Effective From Effective To    [address] [address] [phone] 1/1/2016     [place] [address]       Subscriber Name Subscriber Birth Date Member ID       NGOZI VÁZQUEZ 1940 [id number]                 Emergency Contacts      (Rel.) Home Phone Work Phone Mobile Phone    Sue Boo (Daughter) [phone] -- --            Insurance Information                HUMANA MEDICARE REPL/HUMANA MEDICARE REPL Phone: [phone]    Subscriber: Ngozi Vázquez Subscriber#: [id number]    Group#: [id number] Precert#:           History & Physical     No notes of this type exist for this encounter.        Hospital Medications (active)       Dose Frequency Start End    acetaminophen (TYLENOL) tablet 650 mg 650 mg Every 4 Hours PRN 3/1/2017     Sig - Route: Take 2 tablets by mouth Every 4 (Four) Hours As Needed for mild pain (1-3) or moderate pain (4-6) (severe pain (7-10)). - Oral    aluminum-magnesium hydroxide-simethicone (MAALOX/MYLANTA) " suspension 30 mL 30 mL Every 6 Hours PRN 3/1/2017     Sig - Route: Take 30 mL by mouth Every 6 (Six) Hours As Needed for heartburn. - Oral    CloNIDine (CATAPRES) tablet 0.1 mg 0.1 mg Every 4 Hours PRN 3/1/2017     Sig - Route: Take 1 tablet by mouth Every 4 (Four) Hours As Needed for high blood pressure (For BP > 165/95.  Repeat BP in 1-2hrs.  Call MD for BP > 210/110 or cardiac or neurological symptoms.). - Oral    divalproex (DEPAKOTE) 24 hr tablet 750 mg 750 mg Nightly 3/1/2017     Sig - Route: Take 750 mg by mouth Every Night. - Oral    docusate sodium (COLACE) capsule 100 mg 100 mg 2 Times Daily PRN 3/1/2017     Sig - Route: Take 1 capsule by mouth 2 (Two) Times a Day As Needed for constipation. - Oral    donepezil (ARICEPT) tablet 10 mg 10 mg Nightly 3/1/2017     Sig - Route: Take 1 tablet by mouth Every Night. - Oral    hydrOXYzine (VISTARIL) capsule 50 mg 50 mg Every 6 Hours PRN 3/1/2017     Sig - Route: Take 1 capsule by mouth Every 6 (Six) Hours As Needed for anxiety. - Oral    ibuprofen (ADVIL,MOTRIN) tablet 400 mg 400 mg 3 Times Daily With Meals 3/1/2017     Sig - Route: Take 1 tablet by mouth 3 (Three) Times a Day With Meals. - Oral    loperamide (IMODIUM) capsule 2 mg 2 mg 4 Times Daily PRN 3/1/2017     Sig - Route: Take 1 capsule by mouth 4 (Four) Times a Day As Needed for diarrhea. - Oral    magnesium hydroxide (MILK OF MAGNESIA) suspension 2400 mg/10mL 10 mL 10 mL Daily PRN 3/1/2017     Sig - Route: Take 10 mL by mouth Daily As Needed for constipation. - Oral    memantine (NAMENDA) tablet 5 mg 5 mg 2 Times Daily 3/1/2017     Sig - Route: Take 1 tablet by mouth 2 (Two) Times a Day. - Oral    traZODone (DESYREL) tablet 100 mg 100 mg Nightly 3/1/2017     Sig - Route: Take 1 tablet by mouth Every Night. - Oral          Physician Progress Notes (last 72 hours) (Notes from 2/27/2017  8:47 AM through 3/2/2017  8:47 AM)     No notes of this type exist for this encounter.        Consult Notes (last 72  hours) (Notes from 2/27/2017  8:47 AM through 3/2/2017  8:47 AM)     No notes of this type exist for this encounter.        Physical Therapy Notes (last 72 hours) (Notes from 2/27/2017  8:47 AM through 3/2/2017  8:47 AM)     No notes of this type exist for this encounter.        Occupational Therapy Notes (last 72 hours) (Notes from 2/27/2017  8:47 AM through 3/2/2017  8:47 AM)     No notes of this type exist for this encounter.        Speech Language Pathology Notes (last 72 hours) (Notes from 2/27/2017  8:47 AM through 3/2/2017  8:47 AM)     No notes of this type exist for this encounter.           Nursing Assessments (last 72 hours)      Psych PCS (Body System)     None

## 2017-03-02 NOTE — PLAN OF CARE
Problem: BH Patient Care Overview (Adult)  Goal: Plan of Care Review  Outcome: Ongoing (interventions implemented as appropriate)    Problem: BH Overarching Goals  Goal: Adheres to Safety Considerations for Self and Others  Outcome: Ongoing (interventions implemented as appropriate)  Goal: Optimized Coping Skills in Response to Life Stressors  Outcome: Ongoing (interventions implemented as appropriate)  Goal: Develops/Participates in Therapeutic [place] to Support Successful Transition  Outcome: Ongoing (interventions implemented as appropriate)    Problem: Alteration in Orientation  Goal: Treatment Goal: Demonstrate a reduction of confusion and improved orientation to person, place, time and/or situation upon discharge, according to optimum baseline/ability  Outcome: Ongoing (interventions implemented as appropriate)  Goal: Interact with staff daily  Outcome: Ongoing (interventions implemented as appropriate)  Goal: Express concerns related to confused thinking related to:  Outcome: Ongoing (interventions implemented as appropriate)  Goal: Allow medical examinations, as recommended  Outcome: Ongoing (interventions implemented as appropriate)  Goal: Cooperate with recommended testing/procedures  Outcome: Ongoing (interventions implemented as appropriate)  Goal: Attend and participate in unit activities, including therapeutic, recreational, and educational groups  Outcome: Ongoing (interventions implemented as appropriate)  Goal: Complete daily ADLs, including personal hygiene independently, as able  Outcome: Ongoing (interventions implemented as appropriate)    Problem: Risk for Violence/Aggression Toward Others  Goal: Treatment Goal: Refrain from acts of violence/aggression during length of stay, and demonstrate improved impulse control at the time of discharge  Outcome: Ongoing (interventions implemented as appropriate)  Goal: Verbalize thoughts and feelings associated with:  Outcome: Ongoing (interventions  implemented as appropriate)  Goal: Refrain from harming others  Outcome: Ongoing (interventions implemented as appropriate)  Goal: Refrain from destructive acts on the environment or property  Outcome: Ongoing (interventions implemented as appropriate)  Goal: Control angry outbursts  Outcome: Ongoing (interventions implemented as appropriate)  Goal: Attend and participate in unit activities, including therapeutic, recreational, and educational groups  Outcome: Ongoing (interventions implemented as appropriate)  Goal: Identify appropriate positive anger management techniques  Outcome: Ongoing (interventions implemented as appropriate)

## 2017-03-03 PROCEDURE — 99233 SBSQ HOSP IP/OBS HIGH 50: CPT | Performed by: NURSE PRACTITIONER

## 2017-03-03 RX ADMIN — TRAZODONE HYDROCHLORIDE 100 MG: 100 TABLET ORAL at 20:51

## 2017-03-03 RX ADMIN — DONEPEZIL HYDROCHLORIDE 10 MG: 10 TABLET ORAL at 20:51

## 2017-03-03 RX ADMIN — MEMANTINE 5 MG: 5 TABLET ORAL at 17:05

## 2017-03-03 RX ADMIN — IBUPROFEN 400 MG: 400 TABLET, FILM COATED ORAL at 08:26

## 2017-03-03 RX ADMIN — DIVALPROEX SODIUM 750 MG: 500 TABLET, FILM COATED, EXTENDED RELEASE ORAL at 20:51

## 2017-03-03 RX ADMIN — MEMANTINE 5 MG: 5 TABLET ORAL at 08:26

## 2017-03-03 RX ADMIN — IBUPROFEN 400 MG: 400 TABLET, FILM COATED ORAL at 17:05

## 2017-03-03 RX ADMIN — IBUPROFEN 400 MG: 400 TABLET, FILM COATED ORAL at 11:38

## 2017-03-03 NOTE — PLAN OF CARE
Problem: BH Patient Care Overview (Adult)  Goal: Individualization and Mutuality  Outcome: Ongoing (interventions implemented as appropriate)  Goal: Discharge Needs Assessment  Outcome: Ongoing (interventions implemented as appropriate)    Problem:  Overarching Goals  Goal: Adheres to Safety Considerations for Self and Others  Outcome: Ongoing (interventions implemented as appropriate)  Goal: Optimized Coping Skills in Response to Life Stressors  Outcome: Ongoing (interventions implemented as appropriate)  Goal: Develops/Participates in Therapeutic [place] to Support Successful Transition  Outcome: Ongoing (interventions implemented as appropriate)

## 2017-03-03 NOTE — PLAN OF CARE
"Taylor from [facility] was on the unit to evaluate pt for placement. Taylor stated that she felt that pt was appropriate for their facility. Taylor to discuss with their team and begin process for admission. Pt must go to facility with medicaid pending which may take \"several days.\" Taylor to follow up with CSW asap.  "

## 2017-03-03 NOTE — PLAN OF CARE
Problem: BH Overarching Goals  Goal: Adheres to Safety Considerations for Self and Others  Outcome: Ongoing (interventions implemented as appropriate)  Goal: Optimized Coping Skills in Response to Life Stressors  Outcome: Ongoing (interventions implemented as appropriate)  Goal: Develops/Participates in Therapeutic [place] to Support Successful Transition  Outcome: Ongoing (interventions implemented as appropriate)    Problem: Alteration in Orientation  Goal: Treatment Goal: Demonstrate a reduction of confusion and improved orientation to person, place, time and/or situation upon discharge, according to optimum baseline/ability  Outcome: Ongoing (interventions implemented as appropriate)  Goal: Interact with staff daily  Outcome: Ongoing (interventions implemented as appropriate)  Goal: Express concerns related to confused thinking related to:  Outcome: Ongoing (interventions implemented as appropriate)  Goal: Allow medical examinations, as recommended  Outcome: Ongoing (interventions implemented as appropriate)  Goal: Cooperate with recommended testing/procedures  Outcome: Ongoing (interventions implemented as appropriate)  Goal: Attend and participate in unit activities, including therapeutic, recreational, and educational groups  Outcome: Ongoing (interventions implemented as appropriate)  Goal: Complete daily ADLs, including personal hygiene independently, as able  Outcome: Ongoing (interventions implemented as appropriate)    Problem: Risk for Violence/Aggression Toward Others  Goal: Treatment Goal: Refrain from acts of violence/aggression during length of stay, and demonstrate improved impulse control at the time of discharge  Outcome: Ongoing (interventions implemented as appropriate)  Goal: Verbalize thoughts and feelings associated with:  Outcome: Ongoing (interventions implemented as appropriate)  Goal: Refrain from harming others  Outcome: Ongoing (interventions implemented as appropriate)  Pt gets  aggravated often.  Goal: Refrain from destructive acts on the environment or property  Outcome: Ongoing (interventions implemented as appropriate)  Goal: Control angry outbursts  Outcome: Ongoing (interventions implemented as appropriate)  Pt is monitored appropiately.  Goal: Attend and participate in unit activities, including therapeutic, recreational, and educational groups  Outcome: Ongoing (interventions implemented as appropriate)  Pt attended recreation group today.  Goal: Identify appropriate positive anger management techniques  Outcome: Ongoing (interventions implemented as appropriate)  Pt gets aggravated and yells and swears sometimes.

## 2017-03-03 NOTE — DISCHARGE PLACEMENT REQUEST
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    General Information    Equipment Currently Used at Home none  -JS none  -JS    Living Environment    Lives With  spouse;child(brian), adult  -JS    Living Arrangements  house  -JS    Home Accessibility  no concerns  -JS    Stair Railings at Home  none  -JS    Type of Financial/Environmental Concern  none  -JS    Transportation Available family or friend will provide  -JS family or friend will provide  -JS      User Key  (r) = Recorded By, (t) = Taken By, (c) = Cosigned By    Initials Name Provider Type    RENETTA Da Silva, RN Registered Nurse    JUAN Chin, OT Occupational Therapist    DEMETRICE Martinez, PT Physical Therapist              PT Recommendation and Plan  Anticipated Discharge Disposition: skilled nursing facility  PT Frequency: evaluation only  Plan of Care Review  Plan Of Care Reviewed With: patient  Outcome Summary/Follow up Plan: PT eval completed. Pt completed all mobility with supervision--appearing to be at functional baseline. No skilled PT services needed at this time--pt will need 24/7  care upon d/c due to cognitive and safety impairments. PT will sign off.              Time Calculation:         PT Charges       17 1555          Time Calculation    Start Time 1428  -MM      Stop Time 1450  -MM      Time Calculation (min) 22 min  -MM      PT Received On 17  -MM        User Key  (r) = Recorded By, (t) = Taken By, (c) = Cosigned By    Initials Name Provider Type    MM Flower Martinez PT Physical Therapist          Therapy Charges for Today     Code Description Service Date Service Provider Modifiers Qty    75475366671 HC PT EVAL MOD COMPLEXITY 1 3/2/2017 Flower Martinez PT GP 1               PT Discharge Summary  Anticipated Discharge Disposition: skilled nursing facility  Reason for Discharge: At baseline function, Maximum functional potential achieved  Discharge Destination: SNF    Flower Martinez PT  3/2/2017          Electronically signed by Flower Martinez PT at 3/2/2017  3:56 PM           Occupational Therapy Notes (last 72 hours) (Notes from 2017  8:47 AM through 3/3/2017  8:47 AM)      Pauly Chin OT at 3/2/2017  3:16 PM  Version 1 of 1         Problem: Patient Care Overview (Adult)  Goal: Plan of Care Review  Outcome: Ongoing (interventions implemented as appropriate)    17 1514   Coping/Psychosocial Response Interventions   Plan Of Care Reviewed With patient   Outcome Evaluation   Outcome Summary/Follow up Plan OT eval completed and the pt appears to be at functional baseline at this time and in need of 24/7 care at discharge due to cognitive deficits and safety impairments. The pt has no skilled OT needs identified and OT will sign off.               Electronically signed by Pauly Chin OT at 3/2/2017  3:16 PM      Pauly Chin OT at 3/2/2017  3:17 PM  Version 1 of 1         Acute Care - Occupational Therapy Initial Eval/Discharge  [facility]     Patient Name: Naresh Saunders  : 1940  MRN: [id number]  Today's Date: 3/2/2017     Date of Referral  to OT: 03/02/17         Admit Date: 3/1/2017       ICD-10-CM ICD-9-CM   1. Impaired mobility and ADLs Z74.09 799.89     Patient Active Problem List   Diagnosis   • Major neurocognitive disorder due to Alzheimer's disease, probable, with behavioral disturbance   • Bipolar and related disorder due to another medical condition with mixed features   • Dementia with behavioral disturbance     No past medical history on file.  Past Surgical History   Procedure Laterality Date   • Tonsillectomy     • Adenoidectomy Bilateral           OT ASSESSMENT FLOWSHEET (last 72 hours)      OT Evaluation       03/02/17 1426 03/01/17 2142 03/01/17 2100          Rehab Evaluation    Document Type evaluation;discharge summary  -LW        Subjective Information no complaints;agree to therapy  -LW        Patient Effort, Rehab Treatment adequate  -LW        Symptoms Noted During/After Treatment none  -LW        General Information    Patient Profile Review yes  -LW        General Observations The pt was supine upon arrival and agreeable to OT eval. RN aware of session.  -LW        Precautions/Limitations fall precautions  -LW        Prior Level of Function independent:;all household mobility;ADL's   A with all IADls  -LW        Equipment Currently Used at Home none  -LW none  -JS none  -JS      Plans/Goals Discussed With patient  -LW        Risks Reviewed patient:  -LW        Benefits Reviewed patient:  -LW        Barriers to Rehab cognitive status  -LW        Living Environment    Lives With child(brian), adult;spouse   dtr  -LW  spouse;child(brian), adult  -JS      Living Arrangements house  -LW  house  -JS      Home Accessibility   no concerns  -JS      Stair Railings at Home   none  -JS      Type of Financial/Environmental Concern   none  -JS      Transportation Available family or friend will provide  -LW family or friend will provide  -JS family or friend will provide  -JS      Living Environment Comment The pt is a poor historian and no  family was present during eval. Per chart review the pt resides with family.   -LW        Clinical Impression    Date of Referral to OT 03/02/17  -LW        OT Diagnosis Impaired functional mob and ADls - due to Dementia with Behavioral disturbances  -LW        Functional Level At Time Of Evaluation The pt appears to be at baseline status  -LW        Criteria for Skilled Therapeutic Interventions Met no problems identified which require skilled intervention;current level of function same as previous level of function;no significant expected improvement in functional status  -LW        Therapy Frequency evaluation only  -LW        Anticipated Discharge Disposition home with 24/7 care;placement for care  -LW        Functional Level Prior    Ambulation 0-->independent  -LW  0-->independent  -JS      Transferring 0-->independent  -LW  0-->independent  -JS      Toileting 0-->independent  -LW  0-->independent  -JS      Bathing 0-->independent  -LW  2-->assistive person  -JS      Dressing 0-->independent  -LW  2-->assistive person  -JS      Eating 0-->independent  -LW  0-->independent  -JS      Communication 0-->understands/communicates without difficulty  -LW  2-->difficulty understanding (not related to language barrier)  -JS      Swallowing 0-->swallows foods/liquids without difficulty  -LW  0-->swallows foods/liquids without difficulty  -JS      Pain Assessment    Pain Assessment No/denies pain  -LW        Vision Assessment/Intervention    Visual Impairment WNL  -LW        Cognitive Assessment/Intervention    Current Cognitive/Communication Assessment impaired  -LW        Orientation Status oriented to;person;required verbal cueing (specifiy in comments)   unable to choose correct location or year despite cues  -LW        Follows Commands/Answers Questions 75% of the time;needs increased time;needs repetition  -LW        Personal Safety decreased awareness, need for assist;decreased awareness, need for safety;decreased  insight to deficits;unaware of cognitive deficits;unaware of consequences of deficits;unaware of functional deficits  -LW        Personal Safety Interventions fall prevention program maintained;nonskid shoes/slippers when out of bed  -LW        ROM (Range of Motion)    General ROM no range of motion deficits identified  -LW        MMT (Manual Muscle Testing)    General MMT Assessment no strength deficits identified  -LW        General MMT Assessment Detail grossly 3+/5  -LW        Bed Mobility, Assessment/Treatment    Bed Mobility, Roll Left, [place] supervision required  -LW        Bed Mobility, Scoot/Bridge, [place] supervision required;verbal cues required  -LW        Bed Mob, Supine to Sit, [place] supervision required;verbal cues required  -LW        Bed Mob, Sit to Supine, [place] supervision required  -LW        Transfer Assessment/Treatment    Transfers, Sit-Stand [place] supervision required;verbal cues required  -LW        Transfers, Stand-Sit [place] supervision required  -LW        Toilet Transfer, [place] supervision required  -LW        Lower Body Dressing Assessment/Training    LB Dressing Assess/Train, Clothing Type doffing:;donning:;slipper socks  -LW        LB Dressing Assess/Train, Position edge of bed;sitting  -LW        Positioning and Restraints    Pre-Treatment Position in bed  -LW        Post Treatment Position bed  -LW        In Bed notified nsg;supine  -LW          User Key  (r) = Recorded By, (t) = Taken By, (c) = Cosigned By    Initials Name Effective Dates    JS Enedina Da Silva RN 10/17/16 -     LW Pauly Chin OT 12/19/16 -           Occupational Therapy Education     Title: PT OT SLP Therapies (Resolved)     Topic: Occupational Therapy (Resolved)     Point: ADL training (Resolved)    Description: Instruct learner(s) on proper safety adaptation and remediation techniques during self care or transfers.   Instruct in proper use of assistive  devices.    Learning Progress Summary    Learner Readiness Method Response Comment Documented by Status   Patient Acceptance D NR  LW 03/02/17 1514 Active               Point: Precautions (Resolved)    Description: Instruct learner(s) on prescribed precautions during self-care and functional transfers.    Learning Progress Summary    Learner Readiness Method Response Comment Documented by Status   Patient Acceptance D NR  LW 03/02/17 1514 Active               Point: Body mechanics (Resolved)    Description: Instruct learner(s) on proper positioning and spine alignment during self-care, functional mobility activities and/or exercises.    Learning Progress Summary    Learner Readiness Method Response Comment Documented by Status   Patient Acceptance D NR  LW 03/02/17 1514 Active                      User Key     Initials Effective Dates Name Provider Type Discipline     12/19/16 -  Pauly Chin OT Occupational Therapist OT                OT Recommendation and Plan  Anticipated Discharge Disposition: home with 24/7 care, placement for care  Therapy Frequency: evaluation only  Plan of Care Review  Plan Of Care Reviewed With: patient  Outcome Summary/Follow up Plan: OT eval completed and the pt appears to be at functional baseline at this time and in need of 24/7 care at discharge due to cognitive deficits and safety impairments. The pt has no skilled OT needs identified and OT will sign off.              Time Calculation:         Time Calculation- OT       03/02/17 1517          Time Calculation- OT    OT Start Time 1426  -LW      OT Stop Time 1445  -      OT Time Calculation (min) 19 min  -      OT Non-Billable Time (min) 19 min  -      OT Received On 03/02/17  -        User Key  (r) = Recorded By, (t) = Taken By, (c) = Cosigned By    Initials Name Provider Type    JUAN Chin OT Occupational Therapist          Therapy Charges for Today     Code Description Service Date Service Provider Modifiers  Qty    [id number]  OT EVAL LOW COMPLEXITY 1 3/2/2017 Pauly Chin OT GO 1               OT Discharge Summary  Anticipated Discharge Disposition: home with 24/7 care, placement for care    Pauly Chin OT  3/2/2017     Electronically signed by Pauly Chin OT at 3/2/2017  3:18 PM        Speech Language Pathology Notes (last 72 hours) (Notes from 2/28/2017  8:47 AM through 3/3/2017  8:47 AM)     No notes of this type exist for this encounter.           Nursing Assessments (last 72 hours)      Psych PCS (Body System)       03/02/17 0812                Pain/Comfort/Sleep    Presence Of Pain denies pain/discomfort        Behavioral    General Appearance WDL  WDL except;appearance        General Appearance unkempt        Behavior WDL    Behavior WDL WDL        Emotion Mood WDL    Emotion/Mood/Affect WDL WDL        Speech WDL    Speech WDL WDL        Perceptual State WDL    Perceptual State WDL WDL        Thought Process WDL    Thought Process WDL  WDL except        Judgment and Insight insight not appropriate to situation;judgment not appropriate to situation        Intellectual Performance WDL    Intellectual Performance WDL  WDL except;intellectual performance        Intellectual Performance disoriented to place;disoriented to situation;disoriented to time        Cognitive    Cognitive/Neuro/Behavioral WDL WDL        Skin    Skin WDL WDL        Albert Risk Assessment (If Albert score </= 18, add the appropriate CPG to the care plan)    Sensory Perception 3-->slightly limited        Moisture 3-->occasionally moist        Activity 3-->walks occasionally        Mobility 3-->slightly limited        Nutrition 3-->adequate        Friction and Shear 3-->no apparent problem        Albert Score 18        Suicide Risk    Suicidal Ideation no        Homicide Risk    Homicidal Ideation no        Josi Falls     Mental Status 14-->Confusion/disorientation        Elimination 10-->Elimination with assist         Medications 8-->Psychotropic medications (including benzodiazepines and antidepressants)        Diagnosis 12-->Dementia/delirium        Ambulation/Balance 7-->Independent/steady gait/immobile        Nutrition 0-->No apparent abnormalities with appetite        Sleep Disturbance 8-->No sleep disturbance        History of Falls 8-->No history of falls        Josi Total 77        Safety Interventions    Safety Promotion/Fall Prevention safety round/check completed;nonskid shoes/slippers when out of bed        Environmental Safety Modification clutter free environment maintained

## 2017-03-03 NOTE — PROGRESS NOTES
3/3/2017    Chief Complaint: dementia related behavioral issues    Subjective:  Patient is a 76 y.o. male who was hospitalized for dementia related behavioral issues.   Since yesterday the patient has been compliant with treatments. Will be assessed today for potential nursing home placement. Patient reports medications are effective.  Further history reported: he is improving but still has moments of being irritable and curses at staff. He has not made any specific threats today. Family unable to care for him at home. Now needing nursing home.    Objective     Vital Signs    Temp:  [98.2 °F (36.8 °C)] 98.2 °F (36.8 °C)  Heart Rate:  [80] 80  Resp:  [18] 18  BP: (103)/(72) 103/72    Physical Exam:   General Appearance: alert, appears stated age and cooperative,  Hygiene: fair  Gait & Station: Normal  Musculoskeletal: No tremors or abnormal involuntary movements     Mental Status Exam:   Cooperation: mostly cooperative with care provided. Taking medications  Eye Contact: Fair  Psychomotor Behavior: Restless and pacing  Mood: confused  Affect: constricted  Speech: Minimal  Thought Process: [place]  Associations: Tangential  Thought Content: confused  Suicidal: None  Homicidal: none today  Hallucinations: None  Delusion: None   Cognitive Functioning: poor due to severe confusion.   Consciousness: awake and alert  Orientation: Person  Attention: distractible Concentration: Impaired  Language: Intact Vocabulary: Below Average  Short Term Memory: Deficits  Long Term Memory: Deficits  Fund of Knowledge: Below Average  Reliability: poor  Insight: Poor  Judgement: Poor  Impulse Control: Poor    Lab Results (last 24 hours)     ** No results found for the last 24 hours. **        Imaging Results (last 24 hours)     ** No results found for the last 24 hours. **          Medicine:   Current Facility-Administered Medications:   •  acetaminophen (TYLENOL) tablet 650 mg, 650 mg, Oral, Q4H PRN, Fahad Camara MD  •   aluminum-magnesium hydroxide-simethicone (MAALOX/MYLANTA) suspension 30 mL, 30 mL, Oral, Q6H PRN, Fahad Camara MD  •  CloNIDine (CATAPRES) tablet 0.1 mg, 0.1 mg, Oral, Q4H PRN, Fahad Camara MD  •  divalproex (DEPAKOTE) 24 hr tablet 750 mg, 750 mg, Oral, Nightly, Fahad Camara MD, 750 mg at 03/02/17 2030  •  docusate sodium (COLACE) capsule 100 mg, 100 mg, Oral, BID PRN, Fahad Camara MD  •  donepezil (ARICEPT) tablet 10 mg, 10 mg, Oral, Nightly, Fahad Camara MD, 10 mg at 03/02/17 2030  •  hydrOXYzine (VISTARIL) capsule 50 mg, 50 mg, Oral, Q6H PRN, Fahad Camara MD  •  ibuprofen (ADVIL,MOTRIN) tablet 400 mg, 400 mg, Oral, TID With Meals, Fahad Camara MD, 400 mg at 03/03/17 1138  •  loperamide (IMODIUM) capsule 2 mg, 2 mg, Oral, 4x Daily PRN, Fahad Camara MD  •  magnesium hydroxide (MILK OF MAGNESIA) suspension 2400 mg/10mL 10 mL, 10 mL, Oral, Daily PRN, Fahad Camara MD  •  memantine (NAMENDA) tablet 5 mg, 5 mg, Oral, BID, Fahad Camara MD, 5 mg at 03/03/17 0826  •  traZODone (DESYREL) tablet 100 mg, 100 mg, Oral, Nightly, Fahad Camara MD, 100 mg at 03/02/17 2030    Diagnoses/Assessment:   Active Problems:    Major neurocognitive disorder due to Alzheimer's disease, probable, with behavioral disturbance    Bipolar and related disorder due to another medical condition with mixed features      Treatment Plan:    1) Will continue care for the patient on the behavioral health unit at [facility] to ensure patient safety.  2) Will continue to provide treatment with the unit milieu, activities, therapies and psychopharmacological management.  3) Patient to be placed on or continued on  Q15 minute checks  and Elopement, Aggression and Fall precautions.  4) Will continue medical management by Dr. Villeda's consult.  5) Will order following labs: no new labs ordered  6) Will make the following medication changes: no medication changes  7) Will continue  discharge planning as appropriate for patient. Awaiting placement  8) Psychotherapy provided: none    Treatment plan and medication risks and benefits discussed with: treatment team, .    DIANA Cook  03/03/17  3:04 PM

## 2017-03-04 RX ADMIN — IBUPROFEN 400 MG: 400 TABLET, FILM COATED ORAL at 18:38

## 2017-03-04 RX ADMIN — MEMANTINE 5 MG: 5 TABLET ORAL at 18:38

## 2017-03-04 RX ADMIN — IBUPROFEN 400 MG: 400 TABLET, FILM COATED ORAL at 08:14

## 2017-03-04 RX ADMIN — MEMANTINE 5 MG: 5 TABLET ORAL at 08:14

## 2017-03-04 RX ADMIN — DIVALPROEX SODIUM 750 MG: 500 TABLET, FILM COATED, EXTENDED RELEASE ORAL at 20:19

## 2017-03-04 RX ADMIN — DONEPEZIL HYDROCHLORIDE 10 MG: 10 TABLET ORAL at 20:19

## 2017-03-04 RX ADMIN — TRAZODONE HYDROCHLORIDE 100 MG: 100 TABLET ORAL at 20:19

## 2017-03-04 NOTE — PLAN OF CARE
Problem: BH Patient Care Overview (Adult)  Goal: Plan of Care Review  Outcome: Ongoing (interventions implemented as appropriate)  Goal: Interdisciplinary Rounds/Family Conference  Outcome: Ongoing (interventions implemented as appropriate)  Goal: Individualization and Mutuality  Outcome: Ongoing (interventions implemented as appropriate)  Goal: Discharge Needs Assessment  Outcome: Ongoing (interventions implemented as appropriate)

## 2017-03-04 NOTE — PLAN OF CARE
Problem: BH Patient Care Overview (Adult)  Goal: Plan of Care Review  Outcome: Ongoing (interventions implemented as appropriate)    Problem: BH Overarching Goals  Goal: Adheres to Safety Considerations for Self and Others  Outcome: Ongoing (interventions implemented as appropriate)  Goal: Optimized Coping Skills in Response to Life Stressors  Outcome: Ongoing (interventions implemented as appropriate)  Goal: Develops/Participates in Therapeutic [place] to Support Successful Transition  Outcome: Ongoing (interventions implemented as appropriate)    Problem: Alteration in Orientation  Goal: Treatment Goal: Demonstrate a reduction of confusion and improved orientation to person, place, time and/or situation upon discharge, according to optimum baseline/ability  Outcome: Ongoing (interventions implemented as appropriate)  Goal: Interact with staff daily  Outcome: Ongoing (interventions implemented as appropriate)  Goal: Express concerns related to confused thinking related to:  Outcome: Ongoing (interventions implemented as appropriate)  Goal: Allow medical examinations, as recommended  Outcome: Ongoing (interventions implemented as appropriate)  Goal: Cooperate with recommended testing/procedures  Outcome: Ongoing (interventions implemented as appropriate)  Goal: Attend and participate in unit activities, including therapeutic, recreational, and educational groups  Outcome: Ongoing (interventions implemented as appropriate)  Goal: Complete daily ADLs, including personal hygiene independently, as able  Outcome: Ongoing (interventions implemented as appropriate)    Problem: Risk for Violence/Aggression Toward Others  Goal: Treatment Goal: Refrain from acts of violence/aggression during length of stay, and demonstrate improved impulse control at the time of discharge  Outcome: Ongoing (interventions implemented as appropriate)  Goal: Verbalize thoughts and feelings associated with:  Outcome: Ongoing (interventions  implemented as appropriate)  Goal: Refrain from harming others  Outcome: Ongoing (interventions implemented as appropriate)  Goal: Refrain from destructive acts on the environment or property  Outcome: Ongoing (interventions implemented as appropriate)  Goal: Control angry outbursts  Outcome: Ongoing (interventions implemented as appropriate)  Goal: Attend and participate in unit activities, including therapeutic, recreational, and educational groups  Outcome: Ongoing (interventions implemented as appropriate)  Goal: Identify appropriate positive anger management techniques  Outcome: Ongoing (interventions implemented as appropriate)

## 2017-03-04 NOTE — PROGRESS NOTES
"3/4/2017    Chief Complaint: dementia related behavioral issues    Subjective:  Patient is a 76 y.o. male who was hospitalized for dementia related behavioral issues.   Since yesterday the patient has been calm..  Patient reports that level of hopefulness is 7/10.  Patient reports medications are tolerable and effective.  Further history reported: none    Objective  pt. Is seen and evaluated by me.  Latest clinical data reviewed.  Pt. States doing all right but somewhat confused at times. Tolerating meds well. No complaints voiced.    Vital Signs    Temp:  [98 °F (36.7 °C)] 98 °F (36.7 °C)  Heart Rate:  [45-50] 45  Resp:  [16-18] 16  BP: (111-118)/(59-63) 111/59    Physical Exam:   General Appearance: alert, appears stated age, hard of hearing and cooperative,  Hygiene:   fair  Gait & Station: Normal  Musculoskeletal: No tremors or abnormal involuntary movements    Mental Status Exam:   Cooperation:  Evasive  Eye Contact:  Fair  Psychomotor Behavior:  Slow  Mood: \"Fine\"  Affect:  constricted  Speech:  Minimal  Thought Process:  Poverty of thought  Associations: Loose Associations  Thought Content:     Mood congurent   Suicidal:  None   Homicidal:  None   Hallucinations:  None   Delusion:  None  Cognitive Functioning:   Consciousness: awake, alert and confused  Reliability:  poor  Insight:  Poor  Judgement:  Impaired  Impulse Control:  Fair    Lab Results (last 24 hours)     ** No results found for the last 24 hours. **        Imaging Results (last 24 hours)     ** No results found for the last 24 hours. **          Medicine:   Current Facility-Administered Medications:   •  acetaminophen (TYLENOL) tablet 650 mg, 650 mg, Oral, Q4H PRN, Fahad Camara MD  •  aluminum-magnesium hydroxide-simethicone (MAALOX/MYLANTA) suspension 30 mL, 30 mL, Oral, Q6H PRN, Fahad Camara MD  •  CloNIDine (CATAPRES) tablet 0.1 mg, 0.1 mg, Oral, Q4H PRN, Fahad Camara MD  •  divalproex (DEPAKOTE) 24 hr tablet 750 mg, 750 mg, " Oral, Nightly, Fahad Camara MD, 750 mg at 03/03/17 2051  •  docusate sodium (COLACE) capsule 100 mg, 100 mg, Oral, BID PRN, Fahad Camara MD  •  donepezil (ARICEPT) tablet 10 mg, 10 mg, Oral, Nightly, Fahad Camara MD, 10 mg at 03/03/17 2051  •  hydrOXYzine (VISTARIL) capsule 50 mg, 50 mg, Oral, Q6H PRN, Fahad Camara MD  •  ibuprofen (ADVIL,MOTRIN) tablet 400 mg, 400 mg, Oral, TID With Meals, Fahad Camara MD, 400 mg at 03/04/17 0814  •  loperamide (IMODIUM) capsule 2 mg, 2 mg, Oral, 4x Daily PRN, Fahad Camara MD  •  magnesium hydroxide (MILK OF MAGNESIA) suspension 2400 mg/10mL 10 mL, 10 mL, Oral, Daily PRN, Fahad Camara MD  •  memantine (NAMENDA) tablet 5 mg, 5 mg, Oral, BID, Fahad Camara MD, 5 mg at 03/04/17 0814  •  traZODone (DESYREL) tablet 100 mg, 100 mg, Oral, Nightly, Fahad Camara MD, 100 mg at 03/03/17 2051    Diagnoses/Assessment:   Active Problems:    Major neurocognitive disorder due to Alzheimer's disease, probable, with behavioral disturbance    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  2) Will continue to provide treatment with the unit milieu, activities, therapies and psychopharmacological management.  3) Patient to be placed on or continued on  Q15 minute checks  and Suicide and Aggression precautions.  4) Will continue medical management by Dr. Villeda.  5) Will order following labs: None  6) Will make the following medication changes: none  7) Will continue discharge planning as appropriate for patient.  8) Psychotherapy provided: none    Treatment plan and medication risks and benefits discussed with: Patient    Ashli No MD  03/04/17  12:11 PM

## 2017-03-05 RX ADMIN — MEMANTINE 5 MG: 5 TABLET ORAL at 17:29

## 2017-03-05 RX ADMIN — DIVALPROEX SODIUM 750 MG: 500 TABLET, FILM COATED, EXTENDED RELEASE ORAL at 20:25

## 2017-03-05 RX ADMIN — IBUPROFEN 400 MG: 400 TABLET, FILM COATED ORAL at 12:01

## 2017-03-05 RX ADMIN — TRAZODONE HYDROCHLORIDE 100 MG: 100 TABLET ORAL at 20:25

## 2017-03-05 RX ADMIN — DONEPEZIL HYDROCHLORIDE 10 MG: 10 TABLET ORAL at 20:25

## 2017-03-05 RX ADMIN — IBUPROFEN 400 MG: 400 TABLET, FILM COATED ORAL at 17:29

## 2017-03-05 NOTE — PLAN OF CARE
Problem: BH Patient Care Overview (Adult)  Goal: Plan of Care Review  Outcome: Ongoing (interventions implemented as appropriate)    03/05/17 0548   Coping/Psychosocial Response Interventions   Plan Of Care Reviewed With patient   Patient Care Overview   Progress no change   Outcome Evaluation   Outcome Summary/Follow up Plan Pacing and wandering in the [place]. Intrusive in other's rooms. Pt took the foot board off another pt bed. Irritable at times.    Coping/Psychosocial   Patient Agreement with Plan of Care unable to participate       Goal: Individualization and Mutuality  Outcome: Ongoing (interventions implemented as appropriate)  Goal: Discharge Needs Assessment  Outcome: Ongoing (interventions implemented as appropriate)    Problem:  Overarching Goals  Goal: Adheres to Safety Considerations for Self and Others  Outcome: Ongoing (interventions implemented as appropriate)  Goal: Optimized Coping Skills in Response to Life Stressors  Outcome: Ongoing (interventions implemented as appropriate)  Goal: Develops/Participates in Therapeutic [place] to Support Successful Transition  Outcome: Ongoing (interventions implemented as appropriate)

## 2017-03-05 NOTE — PLAN OF CARE
Problem: BH Patient Care Overview (Adult)  Goal: Plan of Care Review  Outcome: Ongoing (interventions implemented as appropriate)    Problem: BH Overarching Goals  Goal: Adheres to Safety Considerations for Self and Others  Outcome: Ongoing (interventions implemented as appropriate)  Goal: Optimized Coping Skills in Response to Life Stressors  Outcome: Ongoing (interventions implemented as appropriate)  Goal: Develops/Participates in Therapeutic [place] to Support Successful Transition  Outcome: Ongoing (interventions implemented as appropriate)    Problem: Alteration in Orientation  Goal: Treatment Goal: Demonstrate a reduction of confusion and improved orientation to person, place, time and/or situation upon discharge, according to optimum baseline/ability  Outcome: Ongoing (interventions implemented as appropriate)  Goal: Interact with staff daily  Outcome: Ongoing (interventions implemented as appropriate)  Goal: Express concerns related to confused thinking related to:  Outcome: Ongoing (interventions implemented as appropriate)  Goal: Allow medical examinations, as recommended  Outcome: Ongoing (interventions implemented as appropriate)  Goal: Cooperate with recommended testing/procedures  Outcome: Ongoing (interventions implemented as appropriate)  Goal: Attend and participate in unit activities, including therapeutic, recreational, and educational groups  Outcome: Ongoing (interventions implemented as appropriate)  Goal: Complete daily ADLs, including personal hygiene independently, as able  Outcome: Ongoing (interventions implemented as appropriate)    Problem: Risk for Violence/Aggression Toward Others  Goal: Treatment Goal: Refrain from acts of violence/aggression during length of stay, and demonstrate improved impulse control at the time of discharge  Outcome: Ongoing (interventions implemented as appropriate)  Goal: Verbalize thoughts and feelings associated with:  Outcome: Ongoing (interventions  implemented as appropriate)  Goal: Refrain from harming others  Outcome: Ongoing (interventions implemented as appropriate)  Goal: Refrain from destructive acts on the environment or property  Outcome: Ongoing (interventions implemented as appropriate)  Goal: Control angry outbursts  Outcome: Ongoing (interventions implemented as appropriate)  Goal: Attend and participate in unit activities, including therapeutic, recreational, and educational groups  Outcome: Ongoing (interventions implemented as appropriate)  Goal: Identify appropriate positive anger management techniques  Outcome: Ongoing (interventions implemented as appropriate)

## 2017-03-05 NOTE — PROGRESS NOTES
3/5/2017    Chief Complaint: dementia related behavioral issues    Subjective:  Patient is a 76 y.o. male who was hospitalized for dementia related behavioral issues.   Since yesterday the patient has been up at times and pacing,at times banging on the doors and walls, confused.  He also required several re-directions..  Patient reports that level of hopefulness is Unable to assess..  Patient reports medications are tolerable, I guess working..  Further history reported: None    Objective Pt. Seen and evaluated by me.  Latest clinical data reviewed and discussed with staff.  Pt. Has been wandering at times and banging on the doors and walls. Pt. Needs several repetitions to get response.  It is usually breif and concrete.    Vital Signs    Temp:  [96.3 °F (35.7 °C)-96.9 °F (36.1 °C)] 96.9 °F (36.1 °C)  Heart Rate:  [48-57] 48  Resp:  [16-18] 16  BP: (123-124)/(58-64) 123/58    Physical Exam:   General Appearance: alert, appears stated age and cooperative,  Hygiene:   fair  Gait & Station: Needs Assistance  Musculoskeletal: No tremors or abnormal involuntary movements    Mental Status Exam:   Cooperation:  Guarded  Eye Contact:  Fair  Psychomotor Behavior:  Slow  Mood: Apathetic  Affect:  mood-congruent  Speech:  Minimal  Thought Process:  Coherent and [place]  Associations: Goal Directed  Thought Content:     Unable to demonstrate   Suicidal:  None   Homicidal:  None   Hallucinations:  None   Delusion:  None  Cognitive Functioning:   Consciousness: awake, alert and oriented to self but otherwise confused.  Reliability:  fair  Insight:  Poor  Judgement:  Poor  Impulse Control:  Impaired    Lab Results (last 24 hours)     ** No results found for the last 24 hours. **        Imaging Results (last 24 hours)     ** No results found for the last 24 hours. **          Medicine:   Current Facility-Administered Medications:   •  acetaminophen (TYLENOL) tablet 650 mg, 650 mg, Oral, Q4H PRN, Fahad Camara MD  •   aluminum-magnesium hydroxide-simethicone (MAALOX/MYLANTA) suspension 30 mL, 30 mL, Oral, Q6H PRN, Fahad Camara MD  •  CloNIDine (CATAPRES) tablet 0.1 mg, 0.1 mg, Oral, Q4H PRN, Fahad Camara MD  •  divalproex (DEPAKOTE) 24 hr tablet 750 mg, 750 mg, Oral, Nightly, Fahad Camara MD, 750 mg at 03/04/17 2019  •  docusate sodium (COLACE) capsule 100 mg, 100 mg, Oral, BID PRN, Fahad Camara MD  •  donepezil (ARICEPT) tablet 10 mg, 10 mg, Oral, Nightly, Fahad Camara MD, 10 mg at 03/04/17 2019  •  hydrOXYzine (VISTARIL) capsule 50 mg, 50 mg, Oral, Q6H PRN, Fahad Camara MD  •  ibuprofen (ADVIL,MOTRIN) tablet 400 mg, 400 mg, Oral, TID With Meals, Fahad Camara MD, 400 mg at 03/04/17 1838  •  loperamide (IMODIUM) capsule 2 mg, 2 mg, Oral, 4x Daily PRN, Fahad Camara MD  •  magnesium hydroxide (MILK OF MAGNESIA) suspension 2400 mg/10mL 10 mL, 10 mL, Oral, Daily PRN, Fahad Camara MD  •  memantine (NAMENDA) tablet 5 mg, 5 mg, Oral, BID, Fahad Camara MD, 5 mg at 03/04/17 1838  •  traZODone (DESYREL) tablet 100 mg, 100 mg, Oral, Nightly, Fahad Camara MD, 100 mg at 03/04/17 2019    Diagnoses/Assessment:   Active Problems:    Major neurocognitive disorder due to Alzheimer's disease, probable, with behavioral disturbance    Bipolar 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  2) Will continue to provide treatment with the unit milieu, activities, therapies and psychopharmacological management.  3) Patient to be placed on or continued on  Q15 minute checks  and Suicide, Elopement, Aggression and Fall precautions.  4) Will continue medical management by Dr. Villeda.  5) Will order following labs: None  6) Will make the following medication changes: None  7) Will continue discharge planning as  appropriate for patient.  8) Psychotherapy provided: none    Treatment plan and medication risks and benefits discussed with: Patient    Ashli No MD  03/05/17  9:20 AM

## 2017-03-06 PROCEDURE — 99232 SBSQ HOSP IP/OBS MODERATE 35: CPT | Performed by: NURSE PRACTITIONER

## 2017-03-06 RX ADMIN — TRAZODONE HYDROCHLORIDE 100 MG: 100 TABLET ORAL at 21:02

## 2017-03-06 RX ADMIN — IBUPROFEN 400 MG: 400 TABLET, FILM COATED ORAL at 08:00

## 2017-03-06 RX ADMIN — MEMANTINE 5 MG: 5 TABLET ORAL at 17:14

## 2017-03-06 RX ADMIN — DONEPEZIL HYDROCHLORIDE 10 MG: 10 TABLET ORAL at 21:02

## 2017-03-06 RX ADMIN — ACETAMINOPHEN 650 MG: 325 TABLET ORAL at 13:56

## 2017-03-06 RX ADMIN — MEMANTINE 5 MG: 5 TABLET ORAL at 08:00

## 2017-03-06 RX ADMIN — IBUPROFEN 400 MG: 400 TABLET, FILM COATED ORAL at 17:14

## 2017-03-06 RX ADMIN — DIVALPROEX SODIUM 750 MG: 500 TABLET, FILM COATED, EXTENDED RELEASE ORAL at 21:02

## 2017-03-06 NOTE — PLAN OF CARE
Problem: BH Patient Care Overview (Adult)  Goal: Plan of Care Review  Outcome: Ongoing (interventions implemented as appropriate)    Problem: BH Overarching Goals  Goal: Adheres to Safety Considerations for Self and Others  Outcome: Ongoing (interventions implemented as appropriate)  Goal: Optimized Coping Skills in Response to Life Stressors  Outcome: Ongoing (interventions implemented as appropriate)  Goal: Develops/Participates in Therapeutic [place] to Support Successful Transition  Outcome: Ongoing (interventions implemented as appropriate)    Problem: Alteration in Orientation  Goal: Treatment Goal: Demonstrate a reduction of confusion and improved orientation to person, place, time and/or situation upon discharge, according to optimum baseline/ability  Outcome: Ongoing (interventions implemented as appropriate)  Goal: Interact with staff daily  Outcome: Ongoing (interventions implemented as appropriate)  Goal: Express concerns related to confused thinking related to:  Outcome: Ongoing (interventions implemented as appropriate)  Goal: Allow medical examinations, as recommended  Outcome: Ongoing (interventions implemented as appropriate)  Goal: Cooperate with recommended testing/procedures  Outcome: Ongoing (interventions implemented as appropriate)  Goal: Attend and participate in unit activities, including therapeutic, recreational, and educational groups  Outcome: Ongoing (interventions implemented as appropriate)  Goal: Complete daily ADLs, including personal hygiene independently, as able  Outcome: Ongoing (interventions implemented as appropriate)    Problem: Risk for Violence/Aggression Toward Others  Goal: Treatment Goal: Refrain from acts of violence/aggression during length of stay, and demonstrate improved impulse control at the time of discharge  Outcome: Ongoing (interventions implemented as appropriate)  Goal: Verbalize thoughts and feelings associated with:  Outcome: Ongoing (interventions  implemented as appropriate)  Goal: Refrain from harming others  Outcome: Ongoing (interventions implemented as appropriate)  Goal: Refrain from destructive acts on the environment or property  Outcome: Ongoing (interventions implemented as appropriate)  Goal: Control angry outbursts  Outcome: Ongoing (interventions implemented as appropriate)  Goal: Attend and participate in unit activities, including therapeutic, recreational, and educational groups  Outcome: Ongoing (interventions implemented as appropriate)  Goal: Identify appropriate positive anger management techniques  Outcome: Ongoing (interventions implemented as appropriate)

## 2017-03-06 NOTE — PROGRESS NOTES
"    3/6/2017    Chief Complaint: dementia related behavioral issues    Subjective:  Patient is a 76 y.o. male who was hospitalized for dementia related behavioral issues.   Since yesterday the patient has been stable, not angry outbursts or agitation noted or reported.   Patient reports medications are effective.  Further history reported: He has some right hand pain. Nurse notified. States he did not eat lunch. But did eat sandwich at 1400. Oriented to self only. Did have BM yesterday. Cooperative with cares and medications.     Objective     Vital Signs    Temp:  [97.4 °F (36.3 °C)-97.7 °F (36.5 °C)] 97.4 °F (36.3 °C)  Heart Rate:  [46-58] 58  Resp:  [16-18] 18  BP: (115-124)/(57-69) 124/69    Physical Exam:   General Appearance: alert, appears stated age and cooperative,  Hygiene: fair  Gait & Station: Needs Assistance  Musculoskeletal: No tremors or abnormal involuntary movements     Mental Status Exam:   Cooperation: Guarded  Eye Contact: Fair  Psychomotor Behavior: Slow  Mood: Apathetic  Affect: mood-congruent  Speech: Minimal  Thought Process: Coherent and [place]  Associations: Goal Directed  Thought Content: Able to give appropriate answers to questions asked, or would say, \"I don't know.\"  Unable to demonstrate  Suicidal: None  Homicidal: None  Hallucinations: None  Delusion: Unable to assess due to confusion  Cognitive Functioning: poor  Consciousness: awake, alert and oriented to self but otherwise confused.  Reliability: fair  Insight: Poor  Judgement: Poor  Impulse Control: Impaired    Lab Results (last 24 hours)     ** No results found for the last 24 hours. **        Imaging Results (last 24 hours)     ** No results found for the last 24 hours. **          Medicine:   Current Facility-Administered Medications:   •  acetaminophen (TYLENOL) tablet 650 mg, 650 mg, Oral, Q4H PRN, Fahad Camara MD, 650 mg at 03/06/17 1356  •  aluminum-magnesium hydroxide-simethicone (MAALOX/MYLANTA) suspension 30 " mL, 30 mL, Oral, Q6H PRN, Fahad Camara MD  •  CloNIDine (CATAPRES) tablet 0.1 mg, 0.1 mg, Oral, Q4H PRN, Fahad Camara MD  •  divalproex (DEPAKOTE) 24 hr tablet 750 mg, 750 mg, Oral, Nightly, Fahad Camara MD, 750 mg at 03/05/17 2025  •  docusate sodium (COLACE) capsule 100 mg, 100 mg, Oral, BID PRN, Fahad Camara MD  •  donepezil (ARICEPT) tablet 10 mg, 10 mg, Oral, Nightly, Fahad Camara MD, 10 mg at 03/05/17 2025  •  hydrOXYzine (VISTARIL) capsule 50 mg, 50 mg, Oral, Q6H PRN, Fahad Camara MD  •  ibuprofen (ADVIL,MOTRIN) tablet 400 mg, 400 mg, Oral, TID With Meals, Fahad Camara MD, 400 mg at 03/06/17 0800  •  loperamide (IMODIUM) capsule 2 mg, 2 mg, Oral, 4x Daily PRN, Fahad Camara MD  •  magnesium hydroxide (MILK OF MAGNESIA) suspension 2400 mg/10mL 10 mL, 10 mL, Oral, Daily PRN, Fahad Camara MD  •  memantine (NAMENDA) tablet 5 mg, 5 mg, Oral, BID, Fahad Camara MD, 5 mg at 03/06/17 0800  •  traZODone (DESYREL) tablet 100 mg, 100 mg, Oral, Nightly, Fahad Camara MD, 100 mg at 03/05/17 2025    Diagnoses/Assessment:   Active Problems:    Major neurocognitive disorder due to Alzheimer's disease, probable, with behavioral disturbance    Bipolar and related disorder due to another medical condition with mixed features      Treatment Plan:    1) Will continue care for the patient on the behavioral health unit at [facility] to ensure patient safety.  2) Will continue to provide treatment with the unit milieu, activities, therapies and psychopharmacological management.  3) Patient to be placed on or continued on Q15 minute checks and Suicide, Elopement, Aggression and Fall precautions.  4) Will continue medical management by Dr. Villeda.  5) Will order following labs: None  6) Will make the following medication changes: None  7) Will continue discharge planning as appropriate for patient.  8) Psychotherapy provided: none  Treatment plan and  medication risks and benefits discussed with: Patient and assigned nurse    DIANA Cook  03/06/17  3:03 PM

## 2017-03-07 PROCEDURE — 99232 SBSQ HOSP IP/OBS MODERATE 35: CPT | Performed by: NURSE PRACTITIONER

## 2017-03-07 RX ADMIN — DONEPEZIL HYDROCHLORIDE 10 MG: 10 TABLET ORAL at 20:18

## 2017-03-07 RX ADMIN — TRAZODONE HYDROCHLORIDE 100 MG: 100 TABLET ORAL at 20:18

## 2017-03-07 RX ADMIN — IBUPROFEN 400 MG: 400 TABLET, FILM COATED ORAL at 08:31

## 2017-03-07 RX ADMIN — DIVALPROEX SODIUM 750 MG: 500 TABLET, FILM COATED, EXTENDED RELEASE ORAL at 20:18

## 2017-03-07 RX ADMIN — MEMANTINE 5 MG: 5 TABLET ORAL at 08:31

## 2017-03-07 NOTE — PLAN OF CARE
Problem: BH Patient Care Overview (Adult)  Goal: Plan of Care Review  Outcome: Ongoing (interventions implemented as appropriate)    Problem: BH Overarching Goals  Goal: Adheres to Safety Considerations for Self and Others  Outcome: Ongoing (interventions implemented as appropriate)  Goal: Optimized Coping Skills in Response to Life Stressors  Outcome: Ongoing (interventions implemented as appropriate)  Goal: Develops/Participates in Therapeutic [place] to Support Successful Transition  Outcome: Ongoing (interventions implemented as appropriate)    Problem: Alteration in Orientation  Goal: Treatment Goal: Demonstrate a reduction of confusion and improved orientation to person, place, time and/or situation upon discharge, according to optimum baseline/ability  Outcome: Ongoing (interventions implemented as appropriate)  Goal: Interact with staff daily  Outcome: Ongoing (interventions implemented as appropriate)  Goal: Express concerns related to confused thinking related to:  Outcome: Ongoing (interventions implemented as appropriate)  Goal: Allow medical examinations, as recommended  Outcome: Ongoing (interventions implemented as appropriate)  Goal: Cooperate with recommended testing/procedures  Outcome: Ongoing (interventions implemented as appropriate)  Goal: Attend and participate in unit activities, including therapeutic, recreational, and educational groups  Outcome: Ongoing (interventions implemented as appropriate)  Goal: Complete daily ADLs, including personal hygiene independently, as able  Outcome: Ongoing (interventions implemented as appropriate)    Problem: Risk for Violence/Aggression Toward Others  Goal: Treatment Goal: Refrain from acts of violence/aggression during length of stay, and demonstrate improved impulse control at the time of discharge  Outcome: Ongoing (interventions implemented as appropriate)  Goal: Verbalize thoughts and feelings associated with:  Outcome: Ongoing (interventions  implemented as appropriate)  Goal: Refrain from harming others  Outcome: Ongoing (interventions implemented as appropriate)  Goal: Refrain from destructive acts on the environment or property  Outcome: Ongoing (interventions implemented as appropriate)  Goal: Control angry outbursts  Outcome: Ongoing (interventions implemented as appropriate)  Goal: Attend and participate in unit activities, including therapeutic, recreational, and educational groups  Outcome: Ongoing (interventions implemented as appropriate)  Goal: Identify appropriate positive anger management techniques  Outcome: Ongoing (interventions implemented as appropriate)

## 2017-03-07 NOTE — PROGRESS NOTES
3/7/2017    Chief Complaint: dementia related behavioral issues    Subjective:  Patient is a 76 y.o. male who was hospitalized for dementia related behavioral issues.   Since yesterday the patient has been unchanged, still yells when upset. Able to fed himself. Ambulates in the day area.  Is taking and tolerating his medications.  Further history reported: Enters into some power struggles with one other male peer. Nothing physical, just verbal. Staff is able to redirect him. The other peer yells and curses. Doing well on current medications. Not aggressive and not sedated.     Objective     Vital Signs    Temp:  [97.4 °F (36.3 °C)-98.2 °F (36.8 °C)] 98.2 °F (36.8 °C)  Heart Rate:  [46-58] 46  Resp:  [18] 18  BP: (124-131)/(63-69) 131/63    Physical Exam:   General Appearance: alert, appears stated age and cooperative,  Hygiene: fair  Gait & Station: Needs Assistance  Musculoskeletal: No tremors or abnormal involuntary movements      Mental Status Exam:   Cooperation: Guarded  Eye Contact: Fair  Psychomotor Behavior: Slow  Mood: Apathetic  Affect: mood-congruent  Speech: Minimal  Thought Process: [place] with some noted humor/sarcasm  Associations: Goal Directed  Thought Content: confused  Suicidal: None  Homicidal: None  Hallucinations: None  Delusion: suspiciousness note with some peers  Cognitive Functioning: impaired  Consciousness: awake, alert and oriented to self but otherwise confused.  Reliability: fair  Insight: Poor  Judgement: Poor  Impulse Control: Impaired    Lab Results (last 24 hours)     ** No results found for the last 24 hours. **        Imaging Results (last 24 hours)     ** No results found for the last 24 hours. **          Medicine:   Current Facility-Administered Medications:   •  acetaminophen (TYLENOL) tablet 650 mg, 650 mg, Oral, Q4H PRN, Fahad Camara MD, 650 mg at 03/06/17 1356  •  aluminum-magnesium hydroxide-simethicone (MAALOX/MYLANTA) suspension 30 mL, 30 mL, Oral, Q6H PRN,  Fahad Camara MD  •  CloNIDine (CATAPRES) tablet 0.1 mg, 0.1 mg, Oral, Q4H PRN, Fahad Camara MD  •  divalproex (DEPAKOTE) 24 hr tablet 750 mg, 750 mg, Oral, Nightly, Fahad Camara MD, 750 mg at 03/06/17 2102  •  docusate sodium (COLACE) capsule 100 mg, 100 mg, Oral, BID PRN, Fahad Camara MD  •  donepezil (ARICEPT) tablet 10 mg, 10 mg, Oral, Nightly, Fahad Camara MD, 10 mg at 03/06/17 2102  •  hydrOXYzine (VISTARIL) capsule 50 mg, 50 mg, Oral, Q6H PRN, Fahad Camara MD  •  ibuprofen (ADVIL,MOTRIN) tablet 400 mg, 400 mg, Oral, TID With Meals, Fahad Camara MD, 400 mg at 03/07/17 0831  •  loperamide (IMODIUM) capsule 2 mg, 2 mg, Oral, 4x Daily PRN, Fahad Camara MD  •  magnesium hydroxide (MILK OF MAGNESIA) suspension 2400 mg/10mL 10 mL, 10 mL, Oral, Daily PRN, Fahad Camara MD  •  memantine (NAMENDA) tablet 5 mg, 5 mg, Oral, BID, Fahad Camara MD, 5 mg at 03/07/17 0831  •  traZODone (DESYREL) tablet 100 mg, 100 mg, Oral, Nightly, Fahad Camara MD, 100 mg at 03/06/17 2102    Diagnoses/Assessment:   Active Problems:    Major neurocognitive disorder due to Alzheimer's disease, probable, with behavioral disturbance    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  2) Will continue to provide treatment with the unit milieu, activities, therapies and psychopharmacological management.  3) Patient to be placed on or continued on Q15 minute checks and Suicide, Elopement, Aggression and Fall precautions.  4) Will continue medical management by Dr. Villeda.  5) Will order following labs: None  6) Will make the following medication changes: None  7) Will continue discharge planning as appropriate for patient.  8) Psychotherapy provided: none  Treatment plan: No changes. Awaiting  placement    Stephania Lira, APRN  03/07/17  10:56 AM

## 2017-03-07 NOTE — PLAN OF CARE
Problem: BH Patient Care Overview (Adult)  Goal: Plan of Care Review  Outcome: Ongoing (interventions implemented as appropriate)    03/07/17 0600   Coping/Psychosocial Response Interventions   Plan Of Care Reviewed With patient   Patient Care Overview   Progress no change   Coping/Psychosocial   Patient Agreement with Plan of Care agrees       Goal: Individualization and Mutuality  Outcome: Ongoing (interventions implemented as appropriate)  Goal: Discharge Needs Assessment  Outcome: Ongoing (interventions implemented as appropriate)    Problem:  Overarching Goals  Goal: Adheres to Safety Considerations for Self and Others  Outcome: Ongoing (interventions implemented as appropriate)  Goal: Optimized Coping Skills in Response to Life Stressors  Outcome: Ongoing (interventions implemented as appropriate)  Goal: Develops/Participates in Therapeutic [place] to Support Successful Transition  Outcome: Ongoing (interventions implemented as appropriate)

## 2017-03-08 PROCEDURE — 99232 SBSQ HOSP IP/OBS MODERATE 35: CPT | Performed by: NURSE PRACTITIONER

## 2017-03-08 RX ORDER — DIVALPROEX SODIUM 125 MG/1
125 TABLET, DELAYED RELEASE ORAL EVERY 8 HOURS SCHEDULED
Status: DISCONTINUED | OUTPATIENT
Start: 2017-03-08 | End: 2017-03-09

## 2017-03-08 RX ADMIN — IBUPROFEN 400 MG: 400 TABLET, FILM COATED ORAL at 17:19

## 2017-03-08 RX ADMIN — IBUPROFEN 400 MG: 400 TABLET, FILM COATED ORAL at 12:18

## 2017-03-08 RX ADMIN — DIVALPROEX SODIUM 750 MG: 500 TABLET, FILM COATED, EXTENDED RELEASE ORAL at 20:44

## 2017-03-08 RX ADMIN — IBUPROFEN 400 MG: 400 TABLET, FILM COATED ORAL at 08:17

## 2017-03-08 RX ADMIN — DONEPEZIL HYDROCHLORIDE 10 MG: 10 TABLET ORAL at 20:43

## 2017-03-08 RX ADMIN — DIVALPROEX SODIUM 125 MG: 125 TABLET, DELAYED RELEASE ORAL at 12:18

## 2017-03-08 RX ADMIN — TRAZODONE HYDROCHLORIDE 100 MG: 100 TABLET ORAL at 20:43

## 2017-03-08 RX ADMIN — DIVALPROEX SODIUM 125 MG: 125 TABLET, DELAYED RELEASE ORAL at 20:44

## 2017-03-08 RX ADMIN — MEMANTINE 5 MG: 5 TABLET ORAL at 17:19

## 2017-03-08 RX ADMIN — MEMANTINE 5 MG: 5 TABLET ORAL at 08:17

## 2017-03-08 NOTE — PLAN OF CARE
Problem: BH Patient Care Overview (Adult)  Goal: Plan of Care Review  Outcome: Ongoing (interventions implemented as appropriate)  Goal: Individualization and Mutuality  Outcome: Ongoing (interventions implemented as appropriate)  Goal: Discharge Needs Assessment  Outcome: Ongoing (interventions implemented as appropriate)    Problem: BH Overarching Goals  Goal: Adheres to Safety Considerations for Self and Others  Outcome: Ongoing (interventions implemented as appropriate)  Goal: Optimized Coping Skills in Response to Life Stressors  Outcome: Ongoing (interventions implemented as appropriate)  Goal: Develops/Participates in Therapeutic [place] to Support Successful Transition  Outcome: Ongoing (interventions implemented as appropriate)

## 2017-03-08 NOTE — PROGRESS NOTES
"    3/8/2017    Chief Complaint: dementia related behavioral issues    Subjective:  Patient is a 76 y.o. male who was hospitalized for dementia related behavioral issues.   Since yesterday the patient has been somewhat more irritable and restless. He is taking and tolerating his current medications. Further history reported: Yelling at staff today. Does cooperative with repeated instructions.     Objective     Vital Signs    Visit Vitals   • /73 (BP Location: Right arm, Patient Position: Lying)   • Pulse 57   • Temp 97.6 °F (36.4 °C) (Tympanic)   • Resp 18   • Ht 67\" (170.2 cm)   • Wt 128 lb 1.6 oz (58.1 kg)   • SpO2 96%   • BMI 20.06 kg/m2       Physical Exam:   General Appearance: alert, appears stated age and cooperative,  Hygiene: fair  Gait & Station: Needs Assistance  Musculoskeletal: No tremors or abnormal involuntary movements      Mental Status Exam:   Cooperation: Guarded  Eye Contact: Fair  Psychomotor Behavior: Slow  Mood: Apathetic  Affect: mood-congruent  Speech: Minimal  Thought Process: [place] with some noted humor/sarcasm  Associations: Goal Directed  Thought Content: confused  Suicidal: None  Homicidal: None  Hallucinations: None  Delusion: suspiciousness note with some peers  Cognitive Functioning: impaired  Consciousness: awake, alert and oriented to self but otherwise confused.  Reliability: fair  Insight: Poor  Judgement: Poor  Impulse Control: Impaired    Lab Results (last 24 hours)     ** No results found for the last 24 hours. **        Imaging Results (last 24 hours)     ** No results found for the last 24 hours. **          Medicine:   Current Facility-Administered Medications:   •  acetaminophen (TYLENOL) tablet 650 mg, 650 mg, Oral, Q4H PRN, Fahad Camara MD, 650 mg at 03/06/17 1356  •  aluminum-magnesium hydroxide-simethicone (MAALOX/MYLANTA) suspension 30 mL, 30 mL, Oral, Q6H PRN, Fahad Camara MD  •  CloNIDine (CATAPRES) tablet 0.1 mg, 0.1 mg, Oral, Q4H PRN, Fahad" MD Hoa  •  divalproex (DEPAKOTE) 24 hr tablet 750 mg, 750 mg, Oral, Nightly, Fahad Camara MD, 750 mg at 03/07/17 2018  •  docusate sodium (COLACE) capsule 100 mg, 100 mg, Oral, BID PRN, Fahad Caamra MD  •  donepezil (ARICEPT) tablet 10 mg, 10 mg, Oral, Nightly, Fahad Camara MD, 10 mg at 03/07/17 2018  •  hydrOXYzine (VISTARIL) capsule 50 mg, 50 mg, Oral, Q6H PRN, Fahad Camara MD  •  ibuprofen (ADVIL,MOTRIN) tablet 400 mg, 400 mg, Oral, TID With Meals, Fahad Camara MD, 400 mg at 03/08/17 0817  •  loperamide (IMODIUM) capsule 2 mg, 2 mg, Oral, 4x Daily PRN, Fahad Camara MD  •  magnesium hydroxide (MILK OF MAGNESIA) suspension 2400 mg/10mL 10 mL, 10 mL, Oral, Daily PRN, Fahad Camara MD  •  memantine (NAMENDA) tablet 5 mg, 5 mg, Oral, BID, Fahad Camara MD, 5 mg at 03/08/17 0817  •  traZODone (DESYREL) tablet 100 mg, 100 mg, Oral, Nightly, Fahad Camara MD, 100 mg at 03/07/17 2018    Diagnoses/Assessment:   Active Problems:    Major neurocognitive disorder due to Alzheimer's disease, probable, with behavioral disturbance    Bipolar and related disorder due to another medical condition with mixed features      Treatment Plan:     1) Will continue care for the patient on the behavioral health unit at [facility] to ensure patient safety.  2) Will continue to provide treatment with the unit milieu, activities, therapies and psychopharmacological management.  3) Patient to be placed on or continued on Q15 minute checks and Suicide, Elopement, Aggression and Fall precautions.  4) Will continue medical management by Dr. Villeda.  5) Will order following labs: None  6) Will make the following medication changes: Increase depakote an additional 125 mg every AM  7) Will continue discharge planning as appropriate for patient.  8) Psychotherapy provided: none  Treatment plan: No changes. Awaiting placement    Stephania Lira, DIANA  03/08/17  10:52  AM

## 2017-03-08 NOTE — PLAN OF CARE
Problem: BH Overarching Goals  Goal: Adheres to Safety Considerations for Self and Others  Outcome: Ongoing (interventions implemented as appropriate)  Goal: Optimized Coping Skills in Response to Life Stressors  Outcome: Ongoing (interventions implemented as appropriate)  Goal: Develops/Participates in Therapeutic [place] to Support Successful Transition  Outcome: Ongoing (interventions implemented as appropriate)    Problem: Alteration in Orientation  Goal: Treatment Goal: Demonstrate a reduction of confusion and improved orientation to person, place, time and/or situation upon discharge, according to optimum baseline/ability  Outcome: Ongoing (interventions implemented as appropriate)  Goal: Interact with staff daily  Outcome: Ongoing (interventions implemented as appropriate)  Goal: Express concerns related to confused thinking related to:  Outcome: Ongoing (interventions implemented as appropriate)  Goal: Allow medical examinations, as recommended  Outcome: Ongoing (interventions implemented as appropriate)  Goal: Cooperate with recommended testing/procedures  Outcome: Ongoing (interventions implemented as appropriate)  Goal: Attend and participate in unit activities, including therapeutic, recreational, and educational groups  Outcome: Ongoing (interventions implemented as appropriate)  Goal: Complete daily ADLs, including personal hygiene independently, as able  Outcome: Ongoing (interventions implemented as appropriate)    Problem: Risk for Violence/Aggression Toward Others  Goal: Treatment Goal: Refrain from acts of violence/aggression during length of stay, and demonstrate improved impulse control at the time of discharge  Outcome: Ongoing (interventions implemented as appropriate)  Goal: Verbalize thoughts and feelings associated with:  Outcome: Ongoing (interventions implemented as appropriate)  Goal: Refrain from harming others  Outcome: Ongoing (interventions implemented as appropriate)  Goal: Refrain  from destructive acts on the environment or property  Outcome: Ongoing (interventions implemented as appropriate)  Goal: Control angry outbursts  Outcome: Ongoing (interventions implemented as appropriate)  Goal: Attend and participate in unit activities, including therapeutic, recreational, and educational groups  Outcome: Ongoing (interventions implemented as appropriate)  Goal: Identify appropriate positive anger management techniques  Outcome: Ongoing (interventions implemented as appropriate)

## 2017-03-09 ENCOUNTER — HOSPITAL ENCOUNTER (INPATIENT)
Facility: HOSPITAL | Age: 77
LOS: 18 days | Discharge: NURSING, REHAB OR LONG-TERM CARE | End: 2017-03-27
Attending: PSYCHIATRY & NEUROLOGY | Admitting: PSYCHIATRY & NEUROLOGY

## 2017-03-09 ENCOUNTER — HOSPITAL ENCOUNTER (EMERGENCY)
Facility: HOSPITAL | Age: 77
Discharge: PSYCHIATRIC OR SUBSTANCE USE FACILITY | End: 2017-03-09
Attending: EMERGENCY MEDICINE | Admitting: EMERGENCY MEDICINE

## 2017-03-09 VITALS
SYSTOLIC BLOOD PRESSURE: 123 MMHG | DIASTOLIC BLOOD PRESSURE: 71 MMHG | BODY MASS INDEX: 18.38 KG/M2 | OXYGEN SATURATION: 97 % | RESPIRATION RATE: 18 BRPM | HEIGHT: 68 IN | TEMPERATURE: 97.5 F | HEART RATE: 45 BPM | WEIGHT: 121.3 LBS

## 2017-03-09 VITALS
RESPIRATION RATE: 16 BRPM | HEART RATE: 62 BPM | SYSTOLIC BLOOD PRESSURE: 138 MMHG | BODY MASS INDEX: 20.11 KG/M2 | DIASTOLIC BLOOD PRESSURE: 66 MMHG | WEIGHT: 128.1 LBS | TEMPERATURE: 97 F | OXYGEN SATURATION: 93 % | HEIGHT: 67 IN

## 2017-03-09 DIAGNOSIS — F03.91 MAJOR NEUROCOGNITIVE DISORDER DUE TO ALZHEIMER'S DISEASE, PROBABLE, WITH BEHAVIORAL DISTURBANCE: Primary | ICD-10-CM

## 2017-03-09 DIAGNOSIS — R46.89 AGGRESSIVE BEHAVIOR: ICD-10-CM

## 2017-03-09 PROBLEM — F03.918 DEMENTIA WITH BEHAVIORAL DISTURBANCE (HCC): Status: ACTIVE | Noted: 2017-03-09

## 2017-03-09 LAB
ALBUMIN SERPL-MCNC: 3.3 G/DL (ref 3.4–4.8)
ALBUMIN/GLOB SERPL: 1 G/DL (ref 1.1–1.8)
ALP SERPL-CCNC: 107 U/L (ref 38–126)
ALT SERPL W P-5'-P-CCNC: 43 U/L (ref 21–72)
AMPHET+METHAMPHET UR QL: NEGATIVE
ANION GAP SERPL CALCULATED.3IONS-SCNC: 8 MMOL/L (ref 5–15)
APAP SERPL-MCNC: <10 MCG/ML (ref 10–30)
AST SERPL-CCNC: 36 U/L (ref 17–59)
BARBITURATES UR QL SCN: NEGATIVE
BASOPHILS # BLD AUTO: 0.05 10*3/MM3 (ref 0–0.2)
BASOPHILS NFR BLD AUTO: 0.6 % (ref 0–2)
BENZODIAZ UR QL SCN: NEGATIVE
BILIRUB SERPL-MCNC: 0.4 MG/DL (ref 0.2–1.3)
BUN BLD-MCNC: 21 MG/DL (ref 7–21)
BUN/CREAT SERPL: 21.6 (ref 7–25)
CALCIUM SPEC-SCNC: 9.7 MG/DL (ref 8.4–10.2)
CANNABINOIDS SERPL QL: NEGATIVE
CHLORIDE SERPL-SCNC: 102 MMOL/L (ref 95–110)
CO2 SERPL-SCNC: 25 MMOL/L (ref 22–31)
COCAINE UR QL: NEGATIVE
CREAT BLD-MCNC: 0.97 MG/DL (ref 0.7–1.3)
DEPRECATED RDW RBC AUTO: 56.2 FL (ref 35.1–43.9)
EOSINOPHIL # BLD AUTO: 0.3 10*3/MM3 (ref 0–0.7)
EOSINOPHIL NFR BLD AUTO: 3.9 % (ref 0–7)
ERYTHROCYTE [DISTWIDTH] IN BLOOD BY AUTOMATED COUNT: 16 % (ref 11.5–14.5)
ETHANOL BLD-MCNC: <10 MG/DL (ref 0–10)
ETHANOL UR QL: <0.01 %
GFR SERPL CREATININE-BSD FRML MDRD: 75 ML/MIN/1.73 (ref 42–98)
GLOBULIN UR ELPH-MCNC: 3.2 GM/DL (ref 2.3–3.5)
GLUCOSE BLD-MCNC: 77 MG/DL (ref 60–100)
GLUCOSE BLDC GLUCOMTR-MCNC: 85 MG/DL (ref 70–130)
HCT VFR BLD AUTO: 40.8 % (ref 39–49)
HGB BLD-MCNC: 13.3 G/DL (ref 13.7–17.3)
IMM GRANULOCYTES # BLD: 0.01 10*3/MM3 (ref 0–0.02)
IMM GRANULOCYTES NFR BLD: 0.1 % (ref 0–0.5)
LYMPHOCYTES # BLD AUTO: 2.35 10*3/MM3 (ref 0.6–4.2)
LYMPHOCYTES NFR BLD AUTO: 30.4 % (ref 10–50)
MCH RBC QN AUTO: 30.9 PG (ref 26.5–34)
MCHC RBC AUTO-ENTMCNC: 32.6 G/DL (ref 31.5–36.3)
MCV RBC AUTO: 94.7 FL (ref 80–98)
METHADONE UR QL SCN: NEGATIVE
MONOCYTES # BLD AUTO: 0.98 10*3/MM3 (ref 0–0.9)
MONOCYTES NFR BLD AUTO: 12.7 % (ref 0–12)
NEUTROPHILS # BLD AUTO: 4.04 10*3/MM3 (ref 2–8.6)
NEUTROPHILS NFR BLD AUTO: 52.3 % (ref 37–80)
OPIATES UR QL: NEGATIVE
OXYCODONE UR QL SCN: NEGATIVE
PLATELET # BLD AUTO: 162 10*3/MM3 (ref 150–450)
PMV BLD AUTO: 8.9 FL (ref 8–12)
POTASSIUM BLD-SCNC: 4.2 MMOL/L (ref 3.5–5.1)
PROT SERPL-MCNC: 6.5 G/DL (ref 6.3–8.6)
RBC # BLD AUTO: 4.31 10*6/MM3 (ref 4.37–5.74)
SALICYLATES SERPL-MCNC: <1 MG/DL (ref 10–20)
SODIUM BLD-SCNC: 135 MMOL/L (ref 137–145)
TSH SERPL DL<=0.05 MIU/L-ACNC: 1.59 MIU/ML (ref 0.46–4.68)
VALPROATE SERPL-MCNC: 61.3 MCG/ML (ref 50–120)
WBC NRBC COR # BLD: 7.73 10*3/MM3 (ref 3.2–9.8)

## 2017-03-09 PROCEDURE — 80307 DRUG TEST PRSMV CHEM ANLYZR: CPT | Performed by: EMERGENCY MEDICINE

## 2017-03-09 PROCEDURE — 99238 HOSP IP/OBS DSCHRG MGMT 30/<: CPT | Performed by: NURSE PRACTITIONER

## 2017-03-09 PROCEDURE — 82962 GLUCOSE BLOOD TEST: CPT

## 2017-03-09 PROCEDURE — 99284 EMERGENCY DEPT VISIT MOD MDM: CPT

## 2017-03-09 PROCEDURE — 80164 ASSAY DIPROPYLACETIC ACD TOT: CPT | Performed by: NURSE PRACTITIONER

## 2017-03-09 PROCEDURE — 85025 COMPLETE CBC W/AUTO DIFF WBC: CPT | Performed by: EMERGENCY MEDICINE

## 2017-03-09 PROCEDURE — 84443 ASSAY THYROID STIM HORMONE: CPT | Performed by: EMERGENCY MEDICINE

## 2017-03-09 PROCEDURE — 80053 COMPREHEN METABOLIC PANEL: CPT | Performed by: EMERGENCY MEDICINE

## 2017-03-09 RX ORDER — TRAZODONE HYDROCHLORIDE 50 MG/1
50 TABLET ORAL NIGHTLY PRN
Status: CANCELLED | OUTPATIENT
Start: 2017-03-09

## 2017-03-09 RX ORDER — DIVALPROEX SODIUM 125 MG/1
125 TABLET, DELAYED RELEASE ORAL DAILY
Status: DISCONTINUED | OUTPATIENT
Start: 2017-03-10 | End: 2017-03-09 | Stop reason: HOSPADM

## 2017-03-09 RX ORDER — ACETAMINOPHEN 325 MG/1
650 TABLET ORAL EVERY 4 HOURS PRN
Status: CANCELLED | OUTPATIENT
Start: 2017-03-09

## 2017-03-09 RX ORDER — HYDROXYZINE PAMOATE 25 MG/1
50 CAPSULE ORAL EVERY 6 HOURS PRN
Status: CANCELLED | OUTPATIENT
Start: 2017-03-09

## 2017-03-09 RX ORDER — DIVALPROEX SODIUM 250 MG/1
750 TABLET, EXTENDED RELEASE ORAL NIGHTLY
Qty: 90 TABLET | Refills: 0 | Status: SHIPPED | OUTPATIENT
Start: 2017-03-09 | End: 2017-03-27 | Stop reason: HOSPADM

## 2017-03-09 RX ORDER — TRAZODONE HYDROCHLORIDE 50 MG/1
50 TABLET ORAL NIGHTLY PRN
Status: DISCONTINUED | OUTPATIENT
Start: 2017-03-09 | End: 2017-03-27 | Stop reason: HOSPADM

## 2017-03-09 RX ORDER — DONEPEZIL HYDROCHLORIDE 10 MG/1
10 TABLET, FILM COATED ORAL NIGHTLY
Qty: 90 TABLET | Refills: 0 | Status: SHIPPED | OUTPATIENT
Start: 2017-03-09 | End: 2017-03-27 | Stop reason: HOSPADM

## 2017-03-09 RX ORDER — DIVALPROEX SODIUM 125 MG/1
125 TABLET, DELAYED RELEASE ORAL DAILY
Qty: 30 TABLET | Refills: 0 | Status: SHIPPED | OUTPATIENT
Start: 2017-03-09 | End: 2017-03-27 | Stop reason: HOSPADM

## 2017-03-09 RX ORDER — DIVALPROEX SODIUM 250 MG/1
500 TABLET, DELAYED RELEASE ORAL EVERY 12 HOURS SCHEDULED
Status: CANCELLED | OUTPATIENT
Start: 2017-03-09

## 2017-03-09 RX ORDER — IBUPROFEN 400 MG/1
400 TABLET ORAL
Qty: 90 TABLET | Refills: 0 | Status: SHIPPED | OUTPATIENT
Start: 2017-03-09 | End: 2017-03-27 | Stop reason: HOSPADM

## 2017-03-09 RX ORDER — DIVALPROEX SODIUM 250 MG/1
750 TABLET, EXTENDED RELEASE ORAL NIGHTLY
Qty: 90 TABLET | Refills: 0 | Status: SHIPPED | OUTPATIENT
Start: 2017-03-09 | End: 2017-03-09 | Stop reason: HOSPADM

## 2017-03-09 RX ORDER — LOPERAMIDE HYDROCHLORIDE 2 MG/1
2 CAPSULE ORAL 4 TIMES DAILY PRN
Status: DISCONTINUED | OUTPATIENT
Start: 2017-03-09 | End: 2017-03-27 | Stop reason: HOSPADM

## 2017-03-09 RX ORDER — HYDROXYZINE PAMOATE 50 MG/1
50 CAPSULE ORAL EVERY 6 HOURS PRN
Status: DISCONTINUED | OUTPATIENT
Start: 2017-03-09 | End: 2017-03-27 | Stop reason: HOSPADM

## 2017-03-09 RX ORDER — LOPERAMIDE HYDROCHLORIDE 2 MG/1
2 CAPSULE ORAL 4 TIMES DAILY PRN
Status: CANCELLED | OUTPATIENT
Start: 2017-03-09

## 2017-03-09 RX ORDER — MEMANTINE HYDROCHLORIDE 5 MG/1
5 TABLET ORAL 2 TIMES DAILY
Qty: 60 TABLET | Refills: 0 | Status: SHIPPED | OUTPATIENT
Start: 2017-03-09 | End: 2017-03-27 | Stop reason: HOSPADM

## 2017-03-09 RX ORDER — SODIUM CHLORIDE 0.9 % (FLUSH) 0.9 %
10 SYRINGE (ML) INJECTION AS NEEDED
Status: DISCONTINUED | OUTPATIENT
Start: 2017-03-09 | End: 2017-03-09 | Stop reason: HOSPADM

## 2017-03-09 RX ORDER — MAGNESIUM HYDROXIDE/ALUMINUM HYDROXICE/SIMETHICONE 120; 1200; 1200 MG/30ML; MG/30ML; MG/30ML
30 SUSPENSION ORAL EVERY 6 HOURS PRN
Status: DISCONTINUED | OUTPATIENT
Start: 2017-03-09 | End: 2017-03-27 | Stop reason: HOSPADM

## 2017-03-09 RX ORDER — IBUPROFEN 400 MG/1
400 TABLET ORAL
Status: CANCELLED | OUTPATIENT
Start: 2017-03-10

## 2017-03-09 RX ORDER — DONEPEZIL HYDROCHLORIDE 10 MG/1
10 TABLET, FILM COATED ORAL NIGHTLY
Qty: 30 TABLET | Refills: 0 | Status: SHIPPED | OUTPATIENT
Start: 2017-03-09 | End: 2017-03-09 | Stop reason: HOSPADM

## 2017-03-09 RX ORDER — DONEPEZIL HYDROCHLORIDE 10 MG/1
10 TABLET, FILM COATED ORAL NIGHTLY
Status: DISCONTINUED | OUTPATIENT
Start: 2017-03-10 | End: 2017-03-14

## 2017-03-09 RX ORDER — MEMANTINE HYDROCHLORIDE 5 MG/1
5 TABLET ORAL EVERY 12 HOURS SCHEDULED
Status: CANCELLED | OUTPATIENT
Start: 2017-03-09

## 2017-03-09 RX ORDER — HYDROCODONE BITARTRATE AND ACETAMINOPHEN 10; 325 MG/1; MG/1
1 TABLET ORAL ONCE
Status: DISCONTINUED | OUTPATIENT
Start: 2017-03-09 | End: 2017-03-09

## 2017-03-09 RX ORDER — DONEPEZIL HYDROCHLORIDE 10 MG/1
10 TABLET, FILM COATED ORAL NIGHTLY
Status: CANCELLED | OUTPATIENT
Start: 2017-03-09

## 2017-03-09 RX ORDER — IBUPROFEN 400 MG/1
400 TABLET ORAL
Status: DISCONTINUED | OUTPATIENT
Start: 2017-03-10 | End: 2017-03-12

## 2017-03-09 RX ORDER — DIVALPROEX SODIUM 250 MG/1
500 TABLET, DELAYED RELEASE ORAL EVERY 12 HOURS SCHEDULED
Status: DISCONTINUED | OUTPATIENT
Start: 2017-03-10 | End: 2017-03-13 | Stop reason: DRUGHIGH

## 2017-03-09 RX ORDER — HYDROXYZINE PAMOATE 50 MG/1
50 CAPSULE ORAL EVERY 6 HOURS PRN
Qty: 60 CAPSULE | Refills: 0 | Status: SHIPPED | OUTPATIENT
Start: 2017-03-09 | End: 2017-03-27 | Stop reason: HOSPADM

## 2017-03-09 RX ORDER — TRAZODONE HYDROCHLORIDE 100 MG/1
100 TABLET ORAL NIGHTLY
Qty: 30 TABLET | Refills: 0 | Status: SHIPPED | OUTPATIENT
Start: 2017-03-09 | End: 2017-03-27 | Stop reason: HOSPADM

## 2017-03-09 RX ORDER — MEMANTINE HYDROCHLORIDE 5 MG/1
5 TABLET ORAL EVERY 12 HOURS SCHEDULED
Status: DISCONTINUED | OUTPATIENT
Start: 2017-03-10 | End: 2017-03-27 | Stop reason: HOSPADM

## 2017-03-09 RX ORDER — MEMANTINE HYDROCHLORIDE 5 MG/1
5 TABLET ORAL 2 TIMES DAILY
Qty: 60 TABLET | Refills: 0 | Status: SHIPPED | OUTPATIENT
Start: 2017-03-09 | End: 2017-03-09 | Stop reason: HOSPADM

## 2017-03-09 RX ORDER — ACETAMINOPHEN 325 MG/1
650 TABLET ORAL EVERY 4 HOURS PRN
Status: DISCONTINUED | OUTPATIENT
Start: 2017-03-09 | End: 2017-03-27 | Stop reason: HOSPADM

## 2017-03-09 RX ORDER — MAGNESIUM HYDROXIDE/ALUMINUM HYDROXICE/SIMETHICONE 120; 1200; 1200 MG/30ML; MG/30ML; MG/30ML
30 SUSPENSION ORAL EVERY 6 HOURS PRN
Status: CANCELLED | OUTPATIENT
Start: 2017-03-09

## 2017-03-09 RX ORDER — TRAZODONE HYDROCHLORIDE 100 MG/1
100 TABLET ORAL NIGHTLY
Qty: 30 TABLET | Refills: 0 | Status: SHIPPED | OUTPATIENT
Start: 2017-03-09 | End: 2017-03-09 | Stop reason: HOSPADM

## 2017-03-09 RX ADMIN — DIVALPROEX SODIUM 125 MG: 125 TABLET, DELAYED RELEASE ORAL at 06:08

## 2017-03-09 RX ADMIN — IBUPROFEN 400 MG: 400 TABLET, FILM COATED ORAL at 08:00

## 2017-03-09 RX ADMIN — MEMANTINE 5 MG: 5 TABLET ORAL at 08:00

## 2017-03-09 RX ADMIN — IBUPROFEN 400 MG: 400 TABLET, FILM COATED ORAL at 12:34

## 2017-03-09 NOTE — PLAN OF CARE
Problem: BH Patient Care Overview (Adult)  Goal: Plan of Care Review  Outcome: Ongoing (interventions implemented as appropriate)  Goal: Individualization and Mutuality  Outcome: Ongoing (interventions implemented as appropriate)  Goal: Discharge Needs Assessment  Outcome: Ongoing (interventions implemented as appropriate)    Problem: BH Overarching Goals  Goal: Adheres to Safety Considerations for Self and Others  Outcome: Ongoing (interventions implemented as appropriate)  Goal: Optimized Coping Skills in Response to Life Stressors  Outcome: Ongoing (interventions implemented as appropriate)  Goal: Develops/Participates in Therapeutic [place] to Support Successful Transition  Outcome: Ongoing (interventions implemented as appropriate)

## 2017-03-09 NOTE — DISCHARGE PLACEMENT REQUEST
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  None   Delusion:  None  Cognitive Functioning:   Consciousness: awake, alert and oriented to self but otherwise confused.  Reliability:  fair  Insight:  Poor  Judgement:  Poor  Impulse Control:  Impaired    Lab Results (last 24 hours)     ** No results found for the last 24 hours. **        Imaging Results (last 24 hours)     ** No results found for the last 24 hours. **          Medicine:   Current Facility-Administered Medications:   •  acetaminophen (TYLENOL) tablet 650 mg, 650 mg, Oral, Q4H PRN, Fahad Camara MD  •  aluminum-magnesium hydroxide-simethicone (MAALOX/MYLANTA) suspension 30 mL, 30 mL, Oral, Q6H PRN, Fahad Camara MD  •  CloNIDine (CATAPRES) tablet 0.1 mg, 0.1 mg, Oral, Q4H PRN, Fahad Camara MD  •  divalproex (DEPAKOTE) 24 hr tablet 750 mg, 750 mg, Oral, Nightly, Fahad Camara MD, 750 mg at 03/04/17 2019  •  docusate sodium (COLACE) capsule 100 mg, 100 mg, Oral,  BID PRN, Fahad Camara MD  •  donepezil (ARICEPT) tablet 10 mg, 10 mg, Oral, Nightly, Fahad Camara MD, 10 mg at 03/04/17 2019  •  hydrOXYzine (VISTARIL) capsule 50 mg, 50 mg, Oral, Q6H PRN, Fahad Camara MD  •  ibuprofen (ADVIL,MOTRIN) tablet 400 mg, 400 mg, Oral, TID With Meals, Fahad Camara MD, 400 mg at 03/04/17 1838  •  loperamide (IMODIUM) capsule 2 mg, 2 mg, Oral, 4x Daily PRN, Fahad Camara MD  •  magnesium hydroxide (MILK OF MAGNESIA) suspension 2400 mg/10mL 10 mL, 10 mL, Oral, Daily PRN, Fahad Camara MD  •  memantine (NAMENDA) tablet 5 mg, 5 mg, Oral, BID, Fahad Camara MD, 5 mg at 03/04/17 1838  •  traZODone (DESYREL) tablet 100 mg, 100 mg, Oral, Nightly, Fahad Camara MD, 100 mg at 03/04/17 2019    Diagnoses/Assessment:   Active Problems:    Major neurocognitive disorder due to Alzheimer's disease, probable, with behavioral disturbance    Bipolar and related disorder due to another medical condition with mixed features      Treatment Plan:    1) Will continue care for the patient on the behavioral health unit at [facility] to ensure patient safety.  2) Will continue to provide treatment with the unit milieu, activities, therapies and psychopharmacological management.  3) Patient to be placed on or continued on  Q15 minute checks  and Suicide, Elopement, Aggression and Fall precautions.  4) Will continue medical management by Dr. Villeda.  5) Will order following labs: None  6) Will make the following medication changes: None  7) Will continue discharge planning as appropriate for patient.  8) Psychotherapy provided: none    Treatment plan and medication risks and benefits discussed with: Patient    Ashli No MD  03/05/17  9:20 AM     Electronically signed by Ashli No MD at 3/5/2017  9:28 AM      DIANA Cook at 3/6/2017  3:03 PM  Version 1 of 1    Attestation signed by Fahad Camara MD at 3/6/2017  4:49 PM        I  "have reviewed the documentation above and agree.    Fahad Camara MD  03/06/17  4:49 PM                                       3/6/2017    Chief Complaint: dementia related behavioral issues    Subjective:  Patient is a 76 y.o. male who was hospitalized for dementia related behavioral issues.   Since yesterday the patient has been stable, not angry outbursts or agitation noted or reported.   Patient reports medications are effective.  Further history reported: He has some right hand pain. Nurse notified. States he did not eat lunch. But did eat sandwich at 1400. Oriented to self only. Did have BM yesterday. Cooperative with cares and medications.     Objective     Vital Signs    Temp:  [97.4 °F (36.3 °C)-97.7 °F (36.5 °C)] 97.4 °F (36.3 °C)  Heart Rate:  [46-58] 58  Resp:  [16-18] 18  BP: (115-124)/(57-69) 124/69    Physical Exam:   General Appearance: alert, appears stated age and cooperative,  Hygiene: fair  Gait & Station: Needs Assistance  Musculoskeletal: No tremors or abnormal involuntary movements     Mental Status Exam:   Cooperation: Guarded  Eye Contact: Fair  Psychomotor Behavior: Slow  Mood: Apathetic  Affect: mood-congruent  Speech: Minimal  Thought Process: Coherent and [place]  Associations: Goal Directed  Thought Content: Able to give appropriate answers to questions asked, or would say, \"I don't know.\"  Unable to demonstrate  Suicidal: None  Homicidal: None  Hallucinations: None  Delusion: Unable to assess due to confusion  Cognitive Functioning: poor  Consciousness: awake, alert and oriented to self but otherwise confused.  Reliability: fair  Insight: Poor  Judgement: Poor  Impulse Control: Impaired    Lab Results (last 24 hours)     ** No results found for the last 24 hours. **        Imaging Results (last 24 hours)     ** No results found for the last 24 hours. **          Medicine:   Current Facility-Administered Medications:   •  acetaminophen (TYLENOL) tablet 650 mg, 650 mg, Oral, Q4H " PRN, Fahad Camara MD, 650 mg at 03/06/17 1356  •  aluminum-magnesium hydroxide-simethicone (MAALOX/MYLANTA) suspension 30 mL, 30 mL, Oral, Q6H PRN, Fahad Camara MD  •  CloNIDine (CATAPRES) tablet 0.1 mg, 0.1 mg, Oral, Q4H PRN, Fahad Camara MD  •  divalproex (DEPAKOTE) 24 hr tablet 750 mg, 750 mg, Oral, Nightly, Fahad Camara MD, 750 mg at 03/05/17 2025  •  docusate sodium (COLACE) capsule 100 mg, 100 mg, Oral, BID PRN, Fahad Camara MD  •  donepezil (ARICEPT) tablet 10 mg, 10 mg, Oral, Nightly, Fahad Camara MD, 10 mg at 03/05/17 2025  •  hydrOXYzine (VISTARIL) capsule 50 mg, 50 mg, Oral, Q6H PRN, Fahad Camara MD  •  ibuprofen (ADVIL,MOTRIN) tablet 400 mg, 400 mg, Oral, TID With Meals, Fahad Camara MD, 400 mg at 03/06/17 0800  •  loperamide (IMODIUM) capsule 2 mg, 2 mg, Oral, 4x Daily PRN, Fahad Camara MD  •  magnesium hydroxide (MILK OF MAGNESIA) suspension 2400 mg/10mL 10 mL, 10 mL, Oral, Daily PRN, Fahad Camara MD  •  memantine (NAMENDA) tablet 5 mg, 5 mg, Oral, BID, Fhaad Camara MD, 5 mg at 03/06/17 0800  •  traZODone (DESYREL) tablet 100 mg, 100 mg, Oral, Nightly, Fahad Camara MD, 100 mg at 03/05/17 2025    Diagnoses/Assessment:   Active Problems:    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  2) Will continue to provide treatment with the unit milieu, activities, therapies and psychopharmacological management.  3) Patient to be placed on or continued on Q15 minute checks and Suicide, Elopement, Aggression and Fall precautions.  4) Will continue medical management by Dr. Villeda.  5) Will order following labs: None  6) Will make the following medication changes:  None  7) Will continue discharge planning as appropriate for patient.  8) Psychotherapy provided: none  Treatment plan and medication risks and benefits discussed with: Patient and assigned nurse    DIANA Cook  03/06/17  3:03 PM         Electronically signed by Fahad Camara MD at 3/6/2017  4:49 PM      DIANA Cook at 3/7/2017 10:55 AM  Version 1 of 1             3/7/2017    Chief Complaint: dementia related behavioral issues    Subjective:  Patient is a 76 y.o. male who was hospitalized for dementia related behavioral issues.   Since yesterday the patient has been unchanged, still yells when upset. Able to fed himself. Ambulates in the day area.  Is taking and tolerating his medications.  Further history reported: Enters into some power struggles with one other male peer. Nothing physical, just verbal. Staff is able to redirect him. The other peer yells and curses. Doing well on current medications. Not aggressive and not sedated.     Objective     Vital Signs    Temp:  [97.4 °F (36.3 °C)-98.2 °F (36.8 °C)] 98.2 °F (36.8 °C)  Heart Rate:  [46-58] 46  Resp:  [18] 18  BP: (124-131)/(63-69) 131/63    Physical Exam:   General Appearance: alert, appears stated age and cooperative,  Hygiene: fair  Gait & Station: Needs Assistance  Musculoskeletal: No tremors or abnormal involuntary movements      Mental Status Exam:   Cooperation: Guarded  Eye Contact: Fair  Psychomotor Behavior: Slow  Mood: Apathetic  Affect: mood-congruent  Speech: Minimal  Thought Process: [place] with some noted humor/sarcasm  Associations: Goal Directed  Thought Content: confused  Suicidal: None  Homicidal: None  Hallucinations: None  Delusion: suspiciousness note with some peers  Cognitive Functioning: impaired  Consciousness: awake, alert and oriented to self but otherwise confused.  Reliability: fair  Insight: Poor  Judgement: Poor  Impulse Control: Impaired    Lab Results (last 24 hours)     ** No results  found for the last 24 hours. **        Imaging Results (last 24 hours)     ** No results found for the last 24 hours. **          Medicine:   Current Facility-Administered Medications:   •  acetaminophen (TYLENOL) tablet 650 mg, 650 mg, Oral, Q4H PRN, Fahad Camara MD, 650 mg at 03/06/17 1356  •  aluminum-magnesium hydroxide-simethicone (MAALOX/MYLANTA) suspension 30 mL, 30 mL, Oral, Q6H PRN, Fahad Camara MD  •  CloNIDine (CATAPRES) tablet 0.1 mg, 0.1 mg, Oral, Q4H PRN, Fahad Camara MD  •  divalproex (DEPAKOTE) 24 hr tablet 750 mg, 750 mg, Oral, Nightly, Fahad Camara MD, 750 mg at 03/06/17 2102  •  docusate sodium (COLACE) capsule 100 mg, 100 mg, Oral, BID PRN, Fahad Camara MD  •  donepezil (ARICEPT) tablet 10 mg, 10 mg, Oral, Nightly, Fahad Camara MD, 10 mg at 03/06/17 2102  •  hydrOXYzine (VISTARIL) capsule 50 mg, 50 mg, Oral, Q6H PRN, Fahad Camara MD  •  ibuprofen (ADVIL,MOTRIN) tablet 400 mg, 400 mg, Oral, TID With Meals, Fahad Camara MD, 400 mg at 03/07/17 0831  •  loperamide (IMODIUM) capsule 2 mg, 2 mg, Oral, 4x Daily PRN, Fahad Camara MD  •  magnesium hydroxide (MILK OF MAGNESIA) suspension 2400 mg/10mL 10 mL, 10 mL, Oral, Daily PRN, Fahad Camara MD  •  memantine (NAMENDA) tablet 5 mg, 5 mg, Oral, BID, Fahad Camara MD, 5 mg at 03/07/17 0831  •  traZODone (DESYREL) tablet 100 mg, 100 mg, Oral, Nightly, Fahad Camara MD, 100 mg at 03/06/17 2102    Diagnoses/Assessment:   Active Problems:    Major neurocognitive disorder due to Alzheimer's disease, probable, with behavioral disturbance    Bipolar and related disorder due to another medical condition with mixed features      Treatment Plan:    ) Will continue care for the patient on the behavioral health unit at [facility] to ensure patient safety.  2) Will continue to provide treatment with the unit milieu, activities, therapies and psychopharmacological management.  3)  "Patient to be placed on or continued on Q15 minute checks and Suicide, Elopement, Aggression and Fall precautions.  4) Will continue medical management by Dr. Villeda.  5) Will order following labs: None  6) Will make the following medication changes: None  7) Will continue discharge planning as appropriate for patient.  8) Psychotherapy provided: none  Treatment plan: No changes. Awaiting placement    DIANA Cook  03/07/17  10:56 AM         Electronically signed by DIANA Cook at 3/7/2017  2:25 PM      DIANA Cook at 3/8/2017 10:52 AM  Version 1 of 1             3/8/2017    Chief Complaint: dementia related behavioral issues    Subjective:  Patient is a 76 y.o. male who was hospitalized for dementia related behavioral issues.   Since yesterday the patient has been somewhat more irritable and restless. He is taking and tolerating his current medications. Further history reported: Yelling at staff today. Does cooperative with repeated instructions.     Objective     Vital Signs    Visit Vitals   • /73 (BP Location: Right arm, Patient Position: Lying)   • Pulse 57   • Temp 97.6 °F (36.4 °C) (Tympanic)   • Resp 18   • Ht 67\" (170.2 cm)   • Wt 128 lb 1.6 oz (58.1 kg)   • SpO2 96%   • BMI 20.06 kg/m2       Physical Exam:   General Appearance: alert, appears stated age and cooperative,  Hygiene: fair  Gait & Station: Needs Assistance  Musculoskeletal: No tremors or abnormal involuntary movements      Mental Status Exam:   Cooperation: Guarded  Eye Contact: Fair  Psychomotor Behavior: Slow  Mood: Apathetic  Affect: mood-congruent  Speech: Minimal  Thought Process: [place] with some noted humor/sarcasm  Associations: Goal Directed  Thought Content: confused  Suicidal: None  Homicidal: None  Hallucinations: None  Delusion: suspiciousness note with some peers  Cognitive Functioning: impaired  Consciousness: awake, alert and oriented to self but otherwise " confused.  Reliability: fair  Insight: Poor  Judgement: Poor  Impulse Control: Impaired    Lab Results (last 24 hours)     ** No results found for the last 24 hours. **        Imaging Results (last 24 hours)     ** No results found for the last 24 hours. **          Medicine:   Current Facility-Administered Medications:   •  acetaminophen (TYLENOL) tablet 650 mg, 650 mg, Oral, Q4H PRN, Fahad Camara MD, 650 mg at 03/06/17 1356  •  aluminum-magnesium hydroxide-simethicone (MAALOX/MYLANTA) suspension 30 mL, 30 mL, Oral, Q6H PRN, Fahad Camara MD  •  CloNIDine (CATAPRES) tablet 0.1 mg, 0.1 mg, Oral, Q4H PRN, Fahad Camara MD  •  divalproex (DEPAKOTE) 24 hr tablet 750 mg, 750 mg, Oral, Nightly, Fahad Camara MD, 750 mg at 03/07/17 2018  •  docusate sodium (COLACE) capsule 100 mg, 100 mg, Oral, BID PRN, Fahad Camara MD  •  donepezil (ARICEPT) tablet 10 mg, 10 mg, Oral, Nightly, Fahad Camara MD, 10 mg at 03/07/17 2018  •  hydrOXYzine (VISTARIL) capsule 50 mg, 50 mg, Oral, Q6H PRN, Fahad Camara MD  •  ibuprofen (ADVIL,MOTRIN) tablet 400 mg, 400 mg, Oral, TID With Meals, Fahad Camara MD, 400 mg at 03/08/17 0817  •  loperamide (IMODIUM) capsule 2 mg, 2 mg, Oral, 4x Daily PRN, Fahad Camara MD  •  magnesium hydroxide (MILK OF MAGNESIA) suspension 2400 mg/10mL 10 mL, 10 mL, Oral, Daily PRN, Fahad Camara MD  •  memantine (NAMENDA) tablet 5 mg, 5 mg, Oral, BID, Fahad Camara MD, 5 mg at 03/08/17 0817  •  traZODone (DESYREL) tablet 100 mg, 100 mg, Oral, Nightly, Fahad Camara MD, 100 mg at 03/07/17 2018    Diagnoses/Assessment:   Active Problems:    Major neurocognitive disorder due to Alzheimer's disease, probable, with behavioral disturbance    Bipolar and related disorder due to another medical condition with mixed features      Treatment Plan:     1) Will continue care for the patient on the behavioral health unit at [facility] to ensure patient  safety.  2) Will continue to provide treatment with the unit milieu, activities, therapies and psychopharmacological management.  3) Patient to be placed on or continued on Q15 minute checks and Suicide, Elopement, Aggression and Fall precautions.  4) Will continue medical management by Dr. Villeda.  5) Will order following labs: None  6) Will make the following medication changes: Increase depakote an additional 125 mg every AM  7) Will continue discharge planning as appropriate for patient.  8) Psychotherapy provided: none  Treatment plan: No changes. Awaiting placement    DIANA Cook  03/08/17  10:52 AM         Electronically signed by DIANA Cook at 3/8/2017  3:05 PM        Consult Notes (last 72 hours) (Notes from 3/6/2017 11:50 AM through 3/9/2017 11:50 AM)     No notes of this type exist for this encounter.        Physical Therapy Notes (last 72 hours) (Notes from 3/6/2017 11:50 AM through 3/9/2017 11:50 AM)     No notes of this type exist for this encounter.        Occupational Therapy Notes (last 72 hours) (Notes from 3/6/2017 11:50 AM through 3/9/2017 11:50 AM)     No notes of this type exist for this encounter.           Nursing Assessments (last 72 hours)      Psych PCS (Body System)       03/06/17 1456 03/06/17 2105 03/07/17 1016 03/07/17 2020 03/08/17 0817    Pain/Comfort/Sleep    Presence Of Pain denies pain/discomfort denies pain/discomfort denies pain/discomfort denies pain/discomfort denies pain/discomfort    Preferred Pain Scale  number (Numeric Rating Pain Scale) number (Numeric Rating Pain Scale) number (Numeric Rating Pain Scale)     Pain Rating (0-10): Rest    0     Pain Rating (0-10): Activity    0     Pain/Comfort/Sleep Interventions    Sleep/Rest Enhancement  consistent schedule promoted regular sleep/rest pattern promoted awakenings minimized;regular sleep/rest pattern promoted     Coping/Psychosocial    Verbalized Emotional State  acceptance frustration anger      Plan Of Care Reviewed With  patient patient patient     Patient Agreement with Plan of Care  agrees refuses to participate agrees     Supportive Measures  active listening utilized active listening utilized;self-care encouraged;positive reinforcement provided active listening utilized     Family/Support System Care  self-care encouraged  support provided     Trust Relationship/Rapport  care explained;choices provided;questions answered;reassurance provided care explained;reassurance provided;thoughts/feelings acknowledged care explained;choices provided;questions answered     Coping/Psychosocial Interventions    Behavior Management  boundaries reinforced impulse control promoted impulse control promoted     Environment Familiarity/Consistency  daily routine followed personal clothing/items utilized personal clothing/items utilized     Environmental Support  calm environment promoted calm environment promoted;rest periods encouraged calm environment promoted     HEENT    HEENT WDL   WDL      Behavioral    General Appearance WDL   WDL except;appearance appearance  WDL except;appearance  WDL except    General Appearance  unkempt unkempt;unshaven unkempt unkempt    Behavior WDL    Behavior WDL  WDL all  WDL except;all  WDL except    Interactions  cooperative argumentative;hostile;irrational argumentative argumentative;irrational    Motor Movement  no unusual gestures/mannerisms shuffling shuffling     Emotion Mood WDL    Emotion/Mood/Affect WDL  WDL all WDL except;affect;emotion mood WDL except    Emotion/Mood/Affect  calm tense angry irritable    Patient rated anxiety level  0 0 0 0    Speech WDL    Speech WDL  WDL WDL WDL WDL    Perceptual State WDL    Perceptual State WDL   WDL except;perceptual state all  WDL except;perceptual state WDL    Hallucinations  denies hallucinations denies hallucinations denies hallucinations     Perceptual State  derealization derealization derealization     Thought Process WDL     Thought Process WDL   WDL except;thought process thought process  WDL except;thought process  WDL except    Delusions  no delusions no delusions no delusions     Judgment and Insight  insight not appropriate to situation;judgment not appropriate to situation insight not appropriate to situation;judgment not appropriate to situation judgment not appropriate to situation;insight not appropriate to situation insight not appropriate to situation;judgment not appropriate to situation    Thought Content  preoccupation  preoccupation     Thought Process  disorganized disorganized;illogical disorganized;illogical;linear thought process disorganized    Intellectual Performance WDL    Intellectual Performance WDL   WDL except;intellectual performance all  WDL except;intellectual performance;level of consciousness  WDL except    Intellectual Performance  disoriented to place;disoriented to situation;disoriented to time;forgetfulness disoriented to place;disoriented to situation;disoriented to time;forgetfulness;impaired concentration;memory deficit, immediate;memory deficit, recent;mind wandering disoriented to place;disoriented to situation;disoriented to time;forgetfulness disoriented to time;disoriented to situation;disoriented to place;forgetfulness    Level of Consciousness  Alert Confusion Confusion Confusion    Cognitive    Cognitive/Neuro/Behavioral WDL   WDL except;orientation all  WDL except;orientation  WDL except    Arousal Level   opens eyes spontaneously      Orientation  disoriented to;place;time;situation disoriented to;place;time;situation disoriented to;place;time;situation disoriented to;place;time;situation    Speech   pace/rate variance      Mood/Behavior  calm;cooperative agitated;restless  agitated    Cognitive Interventions    Communication Enhancement Strategies  extra time allowed for response  extra time allowed for response;one step directions provided     Orientation Measures  clock in  view;reorientation provided  clock in view     Sensory Stimulation Regulation  quiet environment promoted  quiet environment promoted     Skin    Skin WDL     WDL    Albert Risk Assessment (If Albert score </= 18, add the appropriate CPG to the care plan)    Sensory Perception  4-->no impairment  4-->no impairment 4-->no impairment    Moisture  4-->rarely moist  3-->occasionally moist 4-->rarely moist    Activity  4-->walks frequently  4-->walks frequently 4-->walks frequently    Mobility  4-->no limitation  4-->no limitation 4-->no limitation    Nutrition  3-->adequate  3-->adequate 3-->adequate    Friction and Shear  3-->no apparent problem  2-->potential problem 3-->no apparent problem    Albert Score  22  20 22    Safety    Safety  ID band on ID band on      Patient Location  hallway       Safety Measures  safety rounds completed safety rounds completed      Diversional Activity (Behavioral Health)   television      Suicide Risk    Suicidal Ideation  no no no no    Homicide Risk    Homicidal Ideation  no no no no    [place]     Mental Status  14-->Confusion/disorientation  14-->Confusion/disorientation 14-->Confusion/disorientation    Elimination  12-->Altered elimination (incontinence, nocturia, frequency)  12-->Altered elimination (incontinence, nocturia, frequency) 12-->Altered elimination (incontinence, nocturia, frequency)    Medications  8-->Psychotropic medications (including benzodiazepines and antidepressants);10-->Cardiac Medications  8-->Psychotropic medications (including benzodiazepines and antidepressants) 8-->Psychotropic medications (including benzodiazepines and antidepressants)    Diagnosis  12-->Dementia/delirium  12-->Dementia/delirium 12-->Dementia/delirium    Ambulation/Balance  7-->Independent/steady gait/immobile  15-->Unsteady but forgets limitations 7-->Independent/steady gait/immobile    Nutrition  0-->No apparent abnormalities with appetite  0-->No apparent abnormalities with  appetite 0-->No apparent abnormalities with appetite    Sleep Disturbance  8-->No sleep disturbance  8-->No sleep disturbance 8-->No sleep disturbance    History of Falls  8-->No history of falls  14-->History of falls in the last 3 months 14-->History of falls in the last 3 months    Moody Total  89  93 85    Safety Interventions    Safety Management  patient interviewed alone patient interviewed alone      Safety Promotion/Fall Prevention  nonskid shoes/slippers when out of bed;safety round/check completed nonskid shoes/slippers when out of bed  safety round/check completed;nonskid shoes/slippers when out of bed    Environmental Safety Modification  clutter free environment maintained clutter free environment maintained  clutter free environment maintained    Daily Care    Activity  up ad melanie  up ad melanie     Elimination Assistance  up to toilet  up to toilet       03/08/17 2100 03/09/17 0800             Pain/Comfort/Sleep    Presence Of Pain denies pain/discomfort denies pain/discomfort       Preferred Pain Scale number (Numeric Rating Pain Scale) number (Numeric Rating Pain Scale)       Pain/Comfort/Sleep Interventions    Sleep/Rest Enhancement  regular sleep/rest pattern promoted       Coping/Psychosocial    Verbalized Emotional State other (see comments)   calm frustration       Plan Of Care Reviewed With  patient       Patient Agreement with Plan of Care  unable to participate       Supportive Measures active listening utilized;verbalization of feelings encouraged;positive reinforcement provided;self-care encouraged        Trust Relationship/Rapport  care explained;thoughts/feelings acknowledged;questions encouraged;empathic listening provided       Coping/Psychosocial Interventions    Behavior Management impulse control promoted;boundaries reinforced boundaries reinforced       Environment Familiarity/Consistency  personal clothing/items utilized       Environmental Support  calm environment promoted        HEENT    HEENT WDL WDL        Behavioral    General Appearance WDL WDL  WDL except;appearance       General Appearance dress appropriate for weather/appropriate for setting unkempt       Behavior WDL    Behavior WDL  WDL except;motor movement  WDL except;interactions       Interactions  argumentative       Motor Movement shuffling no unusual gestures/mannerisms       Emotion Mood WDL    Emotion/Mood/Affect WDL WDL WDL except       Emotion/Mood/Affect  irritable       Patient rated anxiety level 0 0       Speech WDL    Speech WDL WDL WDL       Perceptual State WDL    Perceptual State WDL WDL  WDL except;perceptual state       Hallucinations  denies hallucinations       Perceptual State  derealization       Thought Process WDL    Thought Process WDL  WDL except  WDL except;thought process       Delusions no delusions no delusions       Judgment and Insight insight not appropriate to situation;judgment not appropriate to situation insight not appropriate to situation;judgment not appropriate to situation       Thought Content preoccupation poverty of content       Thought Process circumstantial thought circumstantial thought       Intellectual Performance WDL    Intellectual Performance WDL  WDL except  WDL except;all       Intellectual Performance disoriented to time;disoriented to situation;disoriented to place;forgetfulness disoriented to time;disoriented to situation;disoriented to place;forgetfulness       Level of Consciousness Confusion Confusion       Cognitive    Cognitive/Neuro/Behavioral WDL  WDL except  WDL except       Arousal Level  opens eyes spontaneously       Orientation disoriented to;place;time;situation disoriented to;place;time;situation       Speech clear clear       Mood/Behavior calm;cooperative cooperative       Cognitive Interventions    Communication Enhancement Strategies  extra time allowed for response       Orientation Measures  clock in view       Sensory Stimulation Regulation   music/television provided for stimulation       Respiratory    Respiratory WDL WDL        Cardiac    Cardiac WDL WDL        Peripheral Neurovascular    Peripheral Neurovascular WDL WDL        Gastrointestinal    GI WDL WDL        Genitourinary    Genitourinary WDL WDL        Skin    Skin WDL WDL WDL       Albert Risk Assessment (If Albert score </= 18, add the appropriate CPG to the care plan)    Sensory Perception 4-->no impairment 4-->no impairment       Moisture 4-->rarely moist 4-->rarely moist       Activity 4-->walks frequently 4-->walks frequently       Mobility 4-->no limitation 3-->slightly limited       Nutrition 3-->adequate 3-->adequate       Friction and Shear 3-->no apparent problem 3-->no apparent problem       Albert Score 22 21       Musculoskeletal    Musculoskeletal WDL mobility; WDL except        General Mobility mobility appropriate for age        Suicide Risk    Suicidal Ideation  no       Homicide Risk    Homicidal Ideation  no       [place]     Mental Status 14-->Confusion/disorientation 14-->Confusion/disorientation       Elimination 8-->Independent with control of bowel/bladder 8-->Independent with control of bowel/bladder       Medications 8-->Psychotropic medications (including benzodiazepines and antidepressants) 8-->Psychotropic medications (including benzodiazepines and antidepressants)       Diagnosis 12-->Dementia/delirium 12-->Dementia/delirium       Ambulation/Balance 7-->Independent/steady gait/immobile 7-->Independent/steady gait/immobile       Nutrition 0-->No apparent abnormalities with appetite 0-->No apparent abnormalities with appetite       Sleep Disturbance 8-->No sleep disturbance 8-->No sleep disturbance       History of Falls 14-->History of falls in the last 3 months 14-->History of falls in the last 3 months       [place] 81 81                  Discharge Summary      Discharge Summaries filed by Lizz Diallo MD at 3/4/2017 10:14 AM      Scan on  3/1/2017 : DISCHG 2/10/2017 - 2/23/2017     ADMIT 3/1/2017 (below)              Electronically signed by Interface, Scans Incoming at 3/4/2017 10:14 AM        Discharge Order     Start     Ordered    03/09/17 1027  Discharge patient  Once     Expected Discharge Date:  03/09/17    Expected Discharge Time:  Midday    Discharge Disposition:  Skilled Nursing Facility (DC - External)        03/09/17 1028
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
## 2017-03-09 NOTE — ED NOTES
Behavioral health notified that pt needed evaluation.  Nurse stated that pt was discharged this morning because there were no available beds.      Liliana Evans RN  03/09/17 1755       Liliana Evans RN  03/09/17 175

## 2017-03-10 LAB
HOLD SPECIMEN: NORMAL
WHOLE BLOOD HOLD SPECIMEN: NORMAL

## 2017-03-10 PROCEDURE — 99222 1ST HOSP IP/OBS MODERATE 55: CPT | Performed by: FAMILY MEDICINE

## 2017-03-10 PROCEDURE — 90791 PSYCH DIAGNOSTIC EVALUATION: CPT | Performed by: NURSE PRACTITIONER

## 2017-03-10 RX ADMIN — DIVALPROEX SODIUM 500 MG: 250 TABLET, DELAYED RELEASE ORAL at 21:20

## 2017-03-10 RX ADMIN — IBUPROFEN 400 MG: 400 TABLET, FILM COATED ORAL at 18:00

## 2017-03-10 RX ADMIN — IBUPROFEN 400 MG: 400 TABLET, FILM COATED ORAL at 08:17

## 2017-03-10 RX ADMIN — DIVALPROEX SODIUM 500 MG: 250 TABLET, DELAYED RELEASE ORAL at 00:27

## 2017-03-10 RX ADMIN — MEMANTINE 5 MG: 5 TABLET ORAL at 08:17

## 2017-03-10 RX ADMIN — DIVALPROEX SODIUM 500 MG: 250 TABLET, DELAYED RELEASE ORAL at 08:16

## 2017-03-10 RX ADMIN — MEMANTINE 5 MG: 5 TABLET ORAL at 21:20

## 2017-03-10 RX ADMIN — DONEPEZIL HYDROCHLORIDE 10 MG: 10 TABLET ORAL at 21:20

## 2017-03-10 RX ADMIN — ACETAMINOPHEN 650 MG: 325 TABLET ORAL at 08:16

## 2017-03-10 RX ADMIN — DONEPEZIL HYDROCHLORIDE 10 MG: 10 TABLET ORAL at 00:27

## 2017-03-10 RX ADMIN — MEMANTINE 5 MG: 5 TABLET ORAL at 00:27
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
## 2017-03-10 NOTE — ED NOTES
Dr. Pichardo states pt. Is medically clear after reviewing his prior EKG and states his HR is not a new finding.  Pt. Ready go to to floor at this time.      Regine Baldwin RN  03/09/17 9370
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
## 2017-03-10 NOTE — ED NOTES
Blood specimens hemolized per lab.  States she will send a tech over to recollect.     Regine Baldwin RN  03/09/17 1934

## 2017-03-10 NOTE — ED NOTES
Report given to Gladys on floor who states no additional questions at this time. Concerns with Pts. HR being 49 at times, but after reviewing pts charts this is not a new finding,  Dr. Pichardo made aware and attempted to review old EKG, but no previous EKG available to review.   Pt. Resting at this time. With NAD.  VSS.  Awaiting security to accompany behavioral health to pts. Room.  WIll contact when security is available.      Regine Baldwin RN  03/09/17 2225       Regine Baldwin RN  03/09/17 2238       Regine Baldwin RN  03/10/17 0111

## 2017-03-11 PROBLEM — F03.918 DEMENTIA WITH BEHAVIORAL DISTURBANCE (HCC): Status: RESOLVED | Noted: 2017-03-09 | Resolved: 2017-03-11

## 2017-03-11 PROCEDURE — 99232 SBSQ HOSP IP/OBS MODERATE 35: CPT | Performed by: PSYCHIATRY & NEUROLOGY

## 2017-03-11 RX ADMIN — MEMANTINE 5 MG: 5 TABLET ORAL at 08:42

## 2017-03-11 RX ADMIN — IBUPROFEN 400 MG: 400 TABLET, FILM COATED ORAL at 12:14

## 2017-03-11 RX ADMIN — IBUPROFEN 400 MG: 400 TABLET, FILM COATED ORAL at 18:00

## 2017-03-11 RX ADMIN — DIVALPROEX SODIUM 500 MG: 250 TABLET, DELAYED RELEASE ORAL at 08:41

## 2017-03-11 RX ADMIN — IBUPROFEN 400 MG: 400 TABLET, FILM COATED ORAL at 08:40

## 2017-03-11 RX ADMIN — DONEPEZIL HYDROCHLORIDE 10 MG: 10 TABLET ORAL at 20:10

## 2017-03-11 RX ADMIN — MEMANTINE 5 MG: 5 TABLET ORAL at 20:10

## 2017-03-11 RX ADMIN — DIVALPROEX SODIUM 500 MG: 250 TABLET, DELAYED RELEASE ORAL at 20:10

## 2017-03-12 PROCEDURE — 99232 SBSQ HOSP IP/OBS MODERATE 35: CPT | Performed by: PSYCHIATRY & NEUROLOGY

## 2017-03-12 RX ORDER — IBUPROFEN 200 MG
200 TABLET ORAL
Status: DISCONTINUED | OUTPATIENT
Start: 2017-03-12 | End: 2017-03-27 | Stop reason: HOSPADM

## 2017-03-12 RX ADMIN — TRAZODONE HYDROCHLORIDE 50 MG: 50 TABLET ORAL at 21:20

## 2017-03-12 RX ADMIN — IBUPROFEN 400 MG: 400 TABLET, FILM COATED ORAL at 08:12

## 2017-03-12 RX ADMIN — MEMANTINE 5 MG: 5 TABLET ORAL at 20:15

## 2017-03-12 RX ADMIN — DIVALPROEX SODIUM 500 MG: 250 TABLET, DELAYED RELEASE ORAL at 20:15

## 2017-03-12 RX ADMIN — DIVALPROEX SODIUM 500 MG: 250 TABLET, DELAYED RELEASE ORAL at 08:12

## 2017-03-12 RX ADMIN — IBUPROFEN 200 MG: 200 TABLET, FILM COATED ORAL at 18:00

## 2017-03-12 RX ADMIN — HYDROXYZINE PAMOATE 50 MG: 50 CAPSULE ORAL at 23:56

## 2017-03-12 RX ADMIN — IBUPROFEN 400 MG: 400 TABLET, FILM COATED ORAL at 12:00

## 2017-03-12 RX ADMIN — MEMANTINE 5 MG: 5 TABLET ORAL at 08:12

## 2017-03-12 RX ADMIN — DONEPEZIL HYDROCHLORIDE 10 MG: 10 TABLET ORAL at 20:15

## 2017-03-13 LAB — VALPROATE SERPL-MCNC: 42.4 MCG/ML (ref 50–120)

## 2017-03-13 PROCEDURE — 80164 ASSAY DIPROPYLACETIC ACD TOT: CPT | Performed by: PSYCHIATRY & NEUROLOGY

## 2017-03-13 PROCEDURE — 99232 SBSQ HOSP IP/OBS MODERATE 35: CPT | Performed by: PSYCHIATRY & NEUROLOGY

## 2017-03-13 RX ORDER — DIVALPROEX SODIUM 250 MG/1
500 TABLET, DELAYED RELEASE ORAL DAILY
Status: DISCONTINUED | OUTPATIENT
Start: 2017-03-14 | End: 2017-03-27 | Stop reason: HOSPADM

## 2017-03-13 RX ORDER — DIVALPROEX SODIUM 250 MG/1
750 TABLET, DELAYED RELEASE ORAL NIGHTLY
Status: DISCONTINUED | OUTPATIENT
Start: 2017-03-13 | End: 2017-03-27 | Stop reason: HOSPADM

## 2017-03-13 RX ORDER — DIVALPROEX SODIUM 250 MG/1
250 TABLET, DELAYED RELEASE ORAL NIGHTLY
Status: DISCONTINUED | OUTPATIENT
Start: 2017-03-13 | End: 2017-03-13 | Stop reason: DRUGHIGH

## 2017-03-13 RX ORDER — OLANZAPINE 5 MG/1
10 TABLET, ORALLY DISINTEGRATING ORAL ONCE AS NEEDED
Status: COMPLETED | OUTPATIENT
Start: 2017-03-13 | End: 2017-03-13

## 2017-03-13 RX ORDER — OLANZAPINE 5 MG/1
10 TABLET, ORALLY DISINTEGRATING ORAL ONCE
Status: DISCONTINUED | OUTPATIENT
Start: 2017-03-13 | End: 2017-03-24

## 2017-03-13 RX ADMIN — MEMANTINE 5 MG: 5 TABLET ORAL at 10:26

## 2017-03-13 RX ADMIN — MEMANTINE 5 MG: 5 TABLET ORAL at 21:19

## 2017-03-13 RX ADMIN — IBUPROFEN 200 MG: 200 TABLET, FILM COATED ORAL at 12:32

## 2017-03-13 RX ADMIN — TRAZODONE HYDROCHLORIDE 50 MG: 50 TABLET ORAL at 23:03

## 2017-03-13 RX ADMIN — DONEPEZIL HYDROCHLORIDE 10 MG: 10 TABLET ORAL at 21:19

## 2017-03-13 RX ADMIN — DIVALPROEX SODIUM 750 MG: 250 TABLET, DELAYED RELEASE ORAL at 21:19

## 2017-03-13 RX ADMIN — OLANZAPINE 10 MG: 5 TABLET, ORALLY DISINTEGRATING ORAL at 00:38

## 2017-03-13 RX ADMIN — HYDROXYZINE PAMOATE 50 MG: 50 CAPSULE ORAL at 21:19

## 2017-03-13 RX ADMIN — DIVALPROEX SODIUM 500 MG: 250 TABLET, DELAYED RELEASE ORAL at 10:26

## 2017-03-14 PROCEDURE — 99232 SBSQ HOSP IP/OBS MODERATE 35: CPT | Performed by: NURSE PRACTITIONER

## 2017-03-14 RX ADMIN — DIVALPROEX SODIUM 750 MG: 250 TABLET, DELAYED RELEASE ORAL at 20:29

## 2017-03-14 RX ADMIN — MEMANTINE 5 MG: 5 TABLET ORAL at 20:29

## 2017-03-14 RX ADMIN — IBUPROFEN 200 MG: 200 TABLET, FILM COATED ORAL at 12:45

## 2017-03-14 RX ADMIN — MEMANTINE 5 MG: 5 TABLET ORAL at 08:27

## 2017-03-14 RX ADMIN — DIVALPROEX SODIUM 500 MG: 250 TABLET, DELAYED RELEASE ORAL at 08:26

## 2017-03-14 RX ADMIN — IBUPROFEN 200 MG: 200 TABLET, FILM COATED ORAL at 08:27

## 2017-03-15 PROCEDURE — 99233 SBSQ HOSP IP/OBS HIGH 50: CPT | Performed by: NURSE PRACTITIONER

## 2017-03-15 RX ADMIN — DIVALPROEX SODIUM 500 MG: 250 TABLET, DELAYED RELEASE ORAL at 08:55

## 2017-03-15 RX ADMIN — MEMANTINE 5 MG: 5 TABLET ORAL at 21:03

## 2017-03-15 RX ADMIN — MEMANTINE 5 MG: 5 TABLET ORAL at 08:55

## 2017-03-15 RX ADMIN — IBUPROFEN 200 MG: 200 TABLET, FILM COATED ORAL at 09:07

## 2017-03-15 RX ADMIN — DIVALPROEX SODIUM 750 MG: 250 TABLET, DELAYED RELEASE ORAL at 21:03

## 2017-03-15 RX ADMIN — HYDROXYZINE PAMOATE 50 MG: 50 CAPSULE ORAL at 23:43

## 2017-03-16 PROCEDURE — 99232 SBSQ HOSP IP/OBS MODERATE 35: CPT | Performed by: PSYCHIATRY & NEUROLOGY

## 2017-03-16 RX ADMIN — MEMANTINE 5 MG: 5 TABLET ORAL at 08:43

## 2017-03-16 RX ADMIN — DIVALPROEX SODIUM 750 MG: 250 TABLET, DELAYED RELEASE ORAL at 20:35

## 2017-03-16 RX ADMIN — MEMANTINE 5 MG: 5 TABLET ORAL at 20:35

## 2017-03-16 RX ADMIN — DIVALPROEX SODIUM 500 MG: 250 TABLET, DELAYED RELEASE ORAL at 08:42

## 2017-03-16 RX ADMIN — IBUPROFEN 200 MG: 200 TABLET, FILM COATED ORAL at 08:43

## 2017-03-17 PROCEDURE — 99232 SBSQ HOSP IP/OBS MODERATE 35: CPT | Performed by: NURSE PRACTITIONER

## 2017-03-17 RX ADMIN — IBUPROFEN 200 MG: 200 TABLET, FILM COATED ORAL at 08:04

## 2017-03-17 RX ADMIN — IBUPROFEN 200 MG: 200 TABLET, FILM COATED ORAL at 20:45

## 2017-03-17 RX ADMIN — MEMANTINE 5 MG: 5 TABLET ORAL at 20:46

## 2017-03-17 RX ADMIN — IBUPROFEN 200 MG: 200 TABLET, FILM COATED ORAL at 12:00

## 2017-03-17 RX ADMIN — DIVALPROEX SODIUM 500 MG: 250 TABLET, DELAYED RELEASE ORAL at 08:05

## 2017-03-17 RX ADMIN — DIVALPROEX SODIUM 750 MG: 250 TABLET, DELAYED RELEASE ORAL at 20:46

## 2017-03-17 RX ADMIN — MEMANTINE 5 MG: 5 TABLET ORAL at 08:05

## 2017-03-18 PROCEDURE — 99232 SBSQ HOSP IP/OBS MODERATE 35: CPT | Performed by: NURSE PRACTITIONER

## 2017-03-18 RX ADMIN — MEMANTINE 5 MG: 5 TABLET ORAL at 20:23

## 2017-03-18 RX ADMIN — MEMANTINE 5 MG: 5 TABLET ORAL at 08:35

## 2017-03-18 RX ADMIN — IBUPROFEN 200 MG: 200 TABLET, FILM COATED ORAL at 12:01

## 2017-03-18 RX ADMIN — IBUPROFEN 200 MG: 200 TABLET, FILM COATED ORAL at 08:35

## 2017-03-18 RX ADMIN — DIVALPROEX SODIUM 750 MG: 250 TABLET, DELAYED RELEASE ORAL at 20:23

## 2017-03-18 RX ADMIN — DIVALPROEX SODIUM 500 MG: 250 TABLET, DELAYED RELEASE ORAL at 08:35

## 2017-03-18 RX ADMIN — IBUPROFEN 200 MG: 200 TABLET, FILM COATED ORAL at 20:23

## 2017-03-18 RX ADMIN — IBUPROFEN 200 MG: 200 TABLET, FILM COATED ORAL at 17:53

## 2017-03-19 LAB
ALBUMIN SERPL-MCNC: 3.3 G/DL (ref 3.4–4.8)
ALBUMIN/GLOB SERPL: 1.1 G/DL (ref 1.1–1.8)
ALP SERPL-CCNC: 113 U/L (ref 38–126)
ALT SERPL W P-5'-P-CCNC: 48 U/L (ref 21–72)
ANION GAP SERPL CALCULATED.3IONS-SCNC: 11 MMOL/L (ref 5–15)
AST SERPL-CCNC: 51 U/L (ref 17–59)
BASOPHILS # BLD AUTO: 0.03 10*3/MM3 (ref 0–0.2)
BASOPHILS NFR BLD AUTO: 0.2 % (ref 0–2)
BILIRUB SERPL-MCNC: 0.5 MG/DL (ref 0.2–1.3)
BUN BLD-MCNC: 28 MG/DL (ref 7–21)
BUN/CREAT SERPL: 28.6 (ref 7–25)
CALCIUM SPEC-SCNC: 9.3 MG/DL (ref 8.4–10.2)
CHLORIDE SERPL-SCNC: 106 MMOL/L (ref 95–110)
CO2 SERPL-SCNC: 23 MMOL/L (ref 22–31)
CREAT BLD-MCNC: 0.98 MG/DL (ref 0.7–1.3)
DEPRECATED RDW RBC AUTO: 57.8 FL (ref 35.1–43.9)
EOSINOPHIL # BLD AUTO: 0.04 10*3/MM3 (ref 0–0.7)
EOSINOPHIL NFR BLD AUTO: 0.3 % (ref 0–7)
ERYTHROCYTE [DISTWIDTH] IN BLOOD BY AUTOMATED COUNT: 16.4 % (ref 11.5–14.5)
FLUAV AG NPH QL: NEGATIVE
FLUBV AG NPH QL IA: NEGATIVE
GFR SERPL CREATININE-BSD FRML MDRD: 74 ML/MIN/1.73 (ref 42–98)
GLOBULIN UR ELPH-MCNC: 3.1 GM/DL (ref 2.3–3.5)
GLUCOSE BLD-MCNC: 90 MG/DL (ref 60–100)
HCT VFR BLD AUTO: 43.5 % (ref 39–49)
HGB BLD-MCNC: 14.1 G/DL (ref 13.7–17.3)
IMM GRANULOCYTES # BLD: 0.04 10*3/MM3 (ref 0–0.02)
IMM GRANULOCYTES NFR BLD: 0.3 % (ref 0–0.5)
LYMPHOCYTES # BLD AUTO: 2.16 10*3/MM3 (ref 0.6–4.2)
LYMPHOCYTES NFR BLD AUTO: 15.5 % (ref 10–50)
MCH RBC QN AUTO: 30.8 PG (ref 26.5–34)
MCHC RBC AUTO-ENTMCNC: 32.4 G/DL (ref 31.5–36.3)
MCV RBC AUTO: 95 FL (ref 80–98)
MONOCYTES # BLD AUTO: 2.09 10*3/MM3 (ref 0–0.9)
MONOCYTES NFR BLD AUTO: 15 % (ref 0–12)
NEUTROPHILS # BLD AUTO: 9.61 10*3/MM3 (ref 2–8.6)
NEUTROPHILS NFR BLD AUTO: 68.7 % (ref 37–80)
PLATELET # BLD AUTO: 132 10*3/MM3 (ref 150–450)
PMV BLD AUTO: 9 FL (ref 8–12)
POTASSIUM BLD-SCNC: 4.2 MMOL/L (ref 3.5–5.1)
PROT SERPL-MCNC: 6.4 G/DL (ref 6.3–8.6)
RBC # BLD AUTO: 4.58 10*6/MM3 (ref 4.37–5.74)
SODIUM BLD-SCNC: 140 MMOL/L (ref 137–145)
WBC NRBC COR # BLD: 13.97 10*3/MM3 (ref 3.2–9.8)

## 2017-03-19 PROCEDURE — 99232 SBSQ HOSP IP/OBS MODERATE 35: CPT | Performed by: NURSE PRACTITIONER

## 2017-03-19 PROCEDURE — 87804 INFLUENZA ASSAY W/OPTIC: CPT | Performed by: HOSPITALIST

## 2017-03-19 PROCEDURE — 80053 COMPREHEN METABOLIC PANEL: CPT | Performed by: FAMILY MEDICINE

## 2017-03-19 PROCEDURE — 85025 COMPLETE CBC W/AUTO DIFF WBC: CPT | Performed by: NURSE PRACTITIONER

## 2017-03-19 RX ADMIN — IBUPROFEN 200 MG: 200 TABLET, FILM COATED ORAL at 08:03

## 2017-03-19 RX ADMIN — DIVALPROEX SODIUM 750 MG: 250 TABLET, DELAYED RELEASE ORAL at 21:06

## 2017-03-19 RX ADMIN — DIVALPROEX SODIUM 500 MG: 250 TABLET, DELAYED RELEASE ORAL at 08:25

## 2017-03-19 RX ADMIN — MEMANTINE 5 MG: 5 TABLET ORAL at 08:03

## 2017-03-19 RX ADMIN — HYDROXYZINE PAMOATE 50 MG: 50 CAPSULE ORAL at 08:03

## 2017-03-19 RX ADMIN — MEMANTINE 5 MG: 5 TABLET ORAL at 20:08

## 2017-03-20 ENCOUNTER — APPOINTMENT (OUTPATIENT)
Dept: GENERAL RADIOLOGY | Facility: HOSPITAL | Age: 77
End: 2017-03-20

## 2017-03-20 LAB
BACTERIA UR QL AUTO: ABNORMAL /HPF
BILIRUB UR QL STRIP: NEGATIVE
CLARITY UR: CLEAR
COLOR UR: YELLOW
GLUCOSE UR STRIP-MCNC: NEGATIVE MG/DL
HGB UR QL STRIP.AUTO: NEGATIVE
HYALINE CASTS UR QL AUTO: ABNORMAL /LPF
KETONES UR QL STRIP: ABNORMAL
LEUKOCYTE ESTERASE UR QL STRIP.AUTO: NEGATIVE
NITRITE UR QL STRIP: NEGATIVE
PH UR STRIP.AUTO: 6.5 [PH] (ref 5–9)
PROT UR QL STRIP: NEGATIVE
RBC # UR: ABNORMAL /HPF
REF LAB TEST METHOD: ABNORMAL
SP GR UR STRIP: 1.02 (ref 1–1.03)
SQUAMOUS #/AREA URNS HPF: ABNORMAL /HPF
UROBILINOGEN UR QL STRIP: ABNORMAL
WBC UR QL AUTO: ABNORMAL /HPF

## 2017-03-20 PROCEDURE — 81001 URINALYSIS AUTO W/SCOPE: CPT | Performed by: NURSE PRACTITIONER

## 2017-03-20 PROCEDURE — 71020 HC CHEST PA AND LATERAL: CPT

## 2017-03-20 PROCEDURE — 99232 SBSQ HOSP IP/OBS MODERATE 35: CPT | Performed by: NURSE PRACTITIONER

## 2017-03-20 PROCEDURE — 86580 TB INTRADERMAL TEST: CPT | Performed by: FAMILY MEDICINE

## 2017-03-20 RX ORDER — AZITHROMYCIN 250 MG/1
250 TABLET, FILM COATED ORAL DAILY
Status: COMPLETED | OUTPATIENT
Start: 2017-03-21 | End: 2017-03-24

## 2017-03-20 RX ORDER — AZITHROMYCIN 250 MG/1
500 TABLET, FILM COATED ORAL DAILY
Status: COMPLETED | OUTPATIENT
Start: 2017-03-20 | End: 2017-03-20

## 2017-03-20 RX ADMIN — DIVALPROEX SODIUM 750 MG: 250 TABLET, DELAYED RELEASE ORAL at 20:20

## 2017-03-20 RX ADMIN — IBUPROFEN 200 MG: 200 TABLET, FILM COATED ORAL at 17:08

## 2017-03-20 RX ADMIN — IBUPROFEN 200 MG: 200 TABLET, FILM COATED ORAL at 12:25

## 2017-03-20 RX ADMIN — MEMANTINE 5 MG: 5 TABLET ORAL at 08:07

## 2017-03-20 RX ADMIN — MEMANTINE 5 MG: 5 TABLET ORAL at 20:19

## 2017-03-20 RX ADMIN — DIVALPROEX SODIUM 500 MG: 250 TABLET, DELAYED RELEASE ORAL at 08:07

## 2017-03-20 RX ADMIN — IBUPROFEN 200 MG: 200 TABLET, FILM COATED ORAL at 08:07

## 2017-03-20 RX ADMIN — AZITHROMYCIN 500 MG: 250 TABLET, FILM COATED ORAL at 09:12

## 2017-03-21 PROCEDURE — 99232 SBSQ HOSP IP/OBS MODERATE 35: CPT | Performed by: NURSE PRACTITIONER

## 2017-03-21 RX ADMIN — DIVALPROEX SODIUM 500 MG: 250 TABLET, DELAYED RELEASE ORAL at 08:24

## 2017-03-21 RX ADMIN — MEMANTINE 5 MG: 5 TABLET ORAL at 08:24

## 2017-03-21 RX ADMIN — AZITHROMYCIN 250 MG: 250 TABLET, FILM COATED ORAL at 08:24

## 2017-03-21 RX ADMIN — IBUPROFEN 200 MG: 200 TABLET, FILM COATED ORAL at 20:56

## 2017-03-21 RX ADMIN — MEMANTINE 5 MG: 5 TABLET ORAL at 20:56

## 2017-03-21 RX ADMIN — DIVALPROEX SODIUM 750 MG: 250 TABLET, DELAYED RELEASE ORAL at 20:55

## 2017-03-22 PROCEDURE — 99232 SBSQ HOSP IP/OBS MODERATE 35: CPT | Performed by: NURSE PRACTITIONER

## 2017-03-22 RX ADMIN — DIVALPROEX SODIUM 500 MG: 250 TABLET, DELAYED RELEASE ORAL at 08:29

## 2017-03-22 RX ADMIN — MEMANTINE 5 MG: 5 TABLET ORAL at 08:29

## 2017-03-22 RX ADMIN — AZITHROMYCIN 250 MG: 250 TABLET, FILM COATED ORAL at 08:29

## 2017-03-22 RX ADMIN — DIVALPROEX SODIUM 750 MG: 250 TABLET, DELAYED RELEASE ORAL at 20:48

## 2017-03-22 RX ADMIN — MEMANTINE 5 MG: 5 TABLET ORAL at 20:49

## 2017-03-23 LAB
BDY SITE: NORMAL
INDURATION: 0 MM (ref 0–10)
Lab: 48 (ref 48–72)
Lab: NORMAL
Lab: NORMAL

## 2017-03-23 PROCEDURE — 99232 SBSQ HOSP IP/OBS MODERATE 35: CPT | Performed by: NURSE PRACTITIONER

## 2017-03-23 RX ADMIN — DIVALPROEX SODIUM 750 MG: 250 TABLET, DELAYED RELEASE ORAL at 20:28

## 2017-03-23 RX ADMIN — AZITHROMYCIN 250 MG: 250 TABLET, FILM COATED ORAL at 08:39

## 2017-03-23 RX ADMIN — MEMANTINE 5 MG: 5 TABLET ORAL at 20:27

## 2017-03-23 RX ADMIN — IBUPROFEN 200 MG: 200 TABLET, FILM COATED ORAL at 08:40

## 2017-03-23 RX ADMIN — MEMANTINE 5 MG: 5 TABLET ORAL at 08:40

## 2017-03-23 RX ADMIN — IBUPROFEN 200 MG: 200 TABLET, FILM COATED ORAL at 17:52

## 2017-03-23 RX ADMIN — IBUPROFEN 200 MG: 200 TABLET, FILM COATED ORAL at 12:33

## 2017-03-23 RX ADMIN — DIVALPROEX SODIUM 500 MG: 250 TABLET, DELAYED RELEASE ORAL at 08:40

## 2017-03-24 PROCEDURE — 99232 SBSQ HOSP IP/OBS MODERATE 35: CPT | Performed by: PSYCHIATRY & NEUROLOGY

## 2017-03-24 RX ADMIN — DIVALPROEX SODIUM 500 MG: 250 TABLET, DELAYED RELEASE ORAL at 08:15

## 2017-03-24 RX ADMIN — IBUPROFEN 200 MG: 200 TABLET, FILM COATED ORAL at 20:54

## 2017-03-24 RX ADMIN — AZITHROMYCIN 250 MG: 250 TABLET, FILM COATED ORAL at 08:15

## 2017-03-24 RX ADMIN — MEMANTINE 5 MG: 5 TABLET ORAL at 20:55

## 2017-03-24 RX ADMIN — IBUPROFEN 200 MG: 200 TABLET, FILM COATED ORAL at 17:25

## 2017-03-24 RX ADMIN — HYDROXYZINE PAMOATE 50 MG: 50 CAPSULE ORAL at 03:41

## 2017-03-24 RX ADMIN — TRAZODONE HYDROCHLORIDE 50 MG: 50 TABLET ORAL at 20:53

## 2017-03-24 RX ADMIN — IBUPROFEN 200 MG: 200 TABLET, FILM COATED ORAL at 11:55

## 2017-03-24 RX ADMIN — DIVALPROEX SODIUM 750 MG: 250 TABLET, DELAYED RELEASE ORAL at 20:53

## 2017-03-24 RX ADMIN — MEMANTINE 5 MG: 5 TABLET ORAL at 08:15

## 2017-03-24 RX ADMIN — TRAZODONE HYDROCHLORIDE 50 MG: 50 TABLET ORAL at 00:05

## 2017-03-24 RX ADMIN — IBUPROFEN 200 MG: 200 TABLET, FILM COATED ORAL at 08:15

## 2017-03-24 RX ADMIN — HYDROXYZINE PAMOATE 50 MG: 50 CAPSULE ORAL at 20:54

## 2017-03-25 PROCEDURE — 99232 SBSQ HOSP IP/OBS MODERATE 35: CPT | Performed by: PSYCHIATRY & NEUROLOGY

## 2017-03-25 RX ADMIN — DIVALPROEX SODIUM 500 MG: 250 TABLET, DELAYED RELEASE ORAL at 08:18

## 2017-03-25 RX ADMIN — MEMANTINE 5 MG: 5 TABLET ORAL at 20:45

## 2017-03-25 RX ADMIN — DIVALPROEX SODIUM 750 MG: 250 TABLET, DELAYED RELEASE ORAL at 20:44

## 2017-03-25 RX ADMIN — MEMANTINE 5 MG: 5 TABLET ORAL at 08:18

## 2017-03-26 PROCEDURE — 99232 SBSQ HOSP IP/OBS MODERATE 35: CPT | Performed by: PSYCHIATRY & NEUROLOGY

## 2017-03-26 RX ADMIN — MEMANTINE 5 MG: 5 TABLET ORAL at 08:32

## 2017-03-26 RX ADMIN — IBUPROFEN 200 MG: 200 TABLET, FILM COATED ORAL at 17:17

## 2017-03-26 RX ADMIN — DIVALPROEX SODIUM 750 MG: 250 TABLET, DELAYED RELEASE ORAL at 20:45

## 2017-03-26 RX ADMIN — DIVALPROEX SODIUM 500 MG: 250 TABLET, DELAYED RELEASE ORAL at 08:32

## 2017-03-26 RX ADMIN — MEMANTINE 5 MG: 5 TABLET ORAL at 20:45

## 2017-03-26 RX ADMIN — IBUPROFEN 200 MG: 200 TABLET, FILM COATED ORAL at 08:32

## 2017-03-27 VITALS
TEMPERATURE: 96.4 F | SYSTOLIC BLOOD PRESSURE: 118 MMHG | HEART RATE: 44 BPM | DIASTOLIC BLOOD PRESSURE: 62 MMHG | WEIGHT: 121.03 LBS | BODY MASS INDEX: 18.34 KG/M2 | HEIGHT: 68 IN | OXYGEN SATURATION: 92 % | RESPIRATION RATE: 18 BRPM

## 2017-03-27 PROCEDURE — 25010000002 HALOPERIDOL LACTATE PER 5 MG: Performed by: PSYCHIATRY & NEUROLOGY

## 2017-03-27 PROCEDURE — 25010000002 LORAZEPAM PER 2 MG: Performed by: PSYCHIATRY & NEUROLOGY

## 2017-03-27 PROCEDURE — 99239 HOSP IP/OBS DSCHRG MGMT >30: CPT | Performed by: PSYCHIATRY & NEUROLOGY

## 2017-03-27 RX ORDER — HALOPERIDOL 5 MG/ML
2 INJECTION INTRAMUSCULAR ONCE
Status: DISCONTINUED | OUTPATIENT
Start: 2017-03-27 | End: 2017-03-27

## 2017-03-27 RX ORDER — MAGNESIUM HYDROXIDE/ALUMINUM HYDROXICE/SIMETHICONE 120; 1200; 1200 MG/30ML; MG/30ML; MG/30ML
30 SUSPENSION ORAL EVERY 6 HOURS PRN
Qty: 355 ML | Refills: 0 | Status: SHIPPED | OUTPATIENT
Start: 2017-03-27

## 2017-03-27 RX ORDER — MEMANTINE HYDROCHLORIDE 5 MG/1
5 TABLET ORAL EVERY 12 HOURS SCHEDULED
Qty: 60 TABLET | Refills: 1 | Status: SHIPPED | OUTPATIENT
Start: 2017-03-27

## 2017-03-27 RX ORDER — IBUPROFEN 200 MG
200 TABLET ORAL
Qty: 90 TABLET | Refills: 0 | Status: SHIPPED | OUTPATIENT
Start: 2017-03-27 | End: 2017-03-27

## 2017-03-27 RX ORDER — TRAZODONE HYDROCHLORIDE 50 MG/1
50 TABLET ORAL NIGHTLY PRN
Qty: 30 TABLET | Refills: 1 | Status: SHIPPED | OUTPATIENT
Start: 2017-03-27

## 2017-03-27 RX ORDER — ACETAMINOPHEN 325 MG/1
650 TABLET ORAL EVERY 4 HOURS PRN
Qty: 91 TABLET | Refills: 0 | Status: SHIPPED | OUTPATIENT
Start: 2017-03-27

## 2017-03-27 RX ORDER — DIVALPROEX SODIUM 500 MG/1
500 TABLET, DELAYED RELEASE ORAL DAILY
Qty: 30 TABLET | Refills: 1 | Status: SHIPPED | OUTPATIENT
Start: 2017-03-27

## 2017-03-27 RX ORDER — DIVALPROEX SODIUM 250 MG/1
750 TABLET, DELAYED RELEASE ORAL NIGHTLY
Qty: 90 TABLET | Refills: 0 | Status: SHIPPED | OUTPATIENT
Start: 2017-03-27 | End: 2017-03-27

## 2017-03-27 RX ORDER — HALOPERIDOL 5 MG/ML
2 INJECTION INTRAMUSCULAR ONCE
Status: COMPLETED | OUTPATIENT
Start: 2017-03-27 | End: 2017-03-27

## 2017-03-27 RX ORDER — HYDROXYZINE PAMOATE 50 MG/1
50 CAPSULE ORAL EVERY 6 HOURS PRN
Qty: 120 CAPSULE | Refills: 0 | Status: SHIPPED | OUTPATIENT
Start: 2017-03-27

## 2017-03-27 RX ORDER — IBUPROFEN 200 MG
200 TABLET ORAL
Qty: 91 TABLET | Refills: 0 | Status: SHIPPED | OUTPATIENT
Start: 2017-03-27

## 2017-03-27 RX ORDER — DIVALPROEX SODIUM 250 MG/1
750 TABLET, DELAYED RELEASE ORAL NIGHTLY
Qty: 90 TABLET | Refills: 1 | Status: SHIPPED | OUTPATIENT
Start: 2017-03-27

## 2017-03-27 RX ORDER — LORAZEPAM 2 MG/ML
1 INJECTION INTRAMUSCULAR ONCE
Status: COMPLETED | OUTPATIENT
Start: 2017-03-27 | End: 2017-03-27

## 2017-03-27 RX ORDER — DIVALPROEX SODIUM 500 MG/1
500 TABLET, DELAYED RELEASE ORAL DAILY
Qty: 30 TABLET | Refills: 0 | Status: SHIPPED | OUTPATIENT
Start: 2017-03-27 | End: 2017-03-27

## 2017-03-27 RX ORDER — MEMANTINE HYDROCHLORIDE 5 MG/1
5 TABLET ORAL EVERY 12 HOURS SCHEDULED
Qty: 60 TABLET | Refills: 0 | Status: SHIPPED | OUTPATIENT
Start: 2017-03-27 | End: 2017-03-27

## 2017-03-27 RX ORDER — TRAZODONE HYDROCHLORIDE 50 MG/1
50 TABLET ORAL NIGHTLY PRN
Qty: 30 TABLET | Refills: 0 | Status: SHIPPED | OUTPATIENT
Start: 2017-03-27 | End: 2017-03-27

## 2017-03-27 RX ORDER — ACETAMINOPHEN 325 MG/1
650 TABLET ORAL EVERY 4 HOURS PRN
Qty: 90 TABLET | Refills: 0 | Status: SHIPPED | OUTPATIENT
Start: 2017-03-27 | End: 2017-03-27

## 2017-03-27 RX ORDER — LORAZEPAM 2 MG/ML
1 INJECTION INTRAMUSCULAR ONCE
Status: DISCONTINUED | OUTPATIENT
Start: 2017-03-27 | End: 2017-03-27

## 2017-03-27 RX ADMIN — DIVALPROEX SODIUM 500 MG: 250 TABLET, DELAYED RELEASE ORAL at 08:17

## 2017-03-27 RX ADMIN — IBUPROFEN 200 MG: 200 TABLET, FILM COATED ORAL at 08:17

## 2017-03-27 RX ADMIN — MEMANTINE 5 MG: 5 TABLET ORAL at 08:16

## 2017-03-27 RX ADMIN — TRAZODONE HYDROCHLORIDE 50 MG: 50 TABLET ORAL at 00:11

## 2017-03-27 RX ADMIN — LORAZEPAM 1 MG: 2 INJECTION INTRAMUSCULAR; INTRAVENOUS at 11:08

## 2017-03-27 RX ADMIN — HALOPERIDOL LACTATE 2 MG: 5 INJECTION, SOLUTION INTRAMUSCULAR at 11:09

## 2022-08-15 NOTE — NURSING NOTE
"Behavior   Anxiety 0  Depression 0  Pain 0  AVH no  S/I no  H/I no     Pt unable to participate in assessment related to confusion, Pt gets frustrated easily and yells at staff and peers, pt redirected often. Pt takes meds well. When asked if patient was feeling upset, sad or angry today, pt stated, \"I'm fine, why the hell would you ask me that?            Intervention  Instructed in med usage and effects, encouraged to verbalize needs. Meds administered as ordered.  Pt confused and does not recall medication information provided. Reinforcement of information necessary.        Response  Pt stated he will stop yelling \"when I get listened to\".           Plan  Continue to monitor for safety/  every 15 minute monitoring checks. Continue to reorient, calm and redirect patient as needed.    "
"Behavior   Anxiety 0  Depression 0  Pain 0  AVH no  S/I no  H/I no   Pt. Watching TV with peers. Calm, cooperative & medication compliant. Pt. Asked signee what was wrong with the man that keeps yelling & encouraged his peer to go to bed. Educated pt. On focusing on himself his needs & staff would address peers needs, pt. Relayed he understood. Pt. Hair disheveled & signee encouraged him to comb his hair & pt. Stated \" I am not seeing anybody tonight to worry with it\" and smiled. No complaints voiced.  Intervention  Instructed in med usage and effects, encouraged to verbalize needs. Meds administered as ordered.  Response  Verbalized understanding   Plan  Continue to monitor for safety/  every 15 minute monitoring checks.  "
"Behavior   Patient in [place] eating breakfast.  Patient has fair eye contact.  He has flat affect.  Stated \"no\" to anxiety and depression.  Patient is alert to name only.  He continually is trying to \"fix\" things (ie doors, showers, etc).  He denies SI and HI.  He is not noted to be talking to anyone when he is alone.  Requires assist back to his room as he is unable to remember where his room is.          Intervention  Instructed in med usage and effects, encouraged to verbalize needs. Meds administered as ordered.  Patient easily redirected.  He has not had any type of outburst or aggression this shift.          Response  Verbalized understanding.  Reorientation and redirection required often.          Plan  Continue to monitor for safety every 15 minute monitoring checks.    "
"Behavior  Anxiety 0 with 10 being the worst.   Depression 0 with 10 being the worst.   SI no  HI no  AVH no    1:1 with patient completed. Patient is calm and cooperative in another patient's room. Patient makes fair eye contact and is interacting appropriately with staff.  Patient states \"I'm doing ok.\" Patient is med compliant. Patient denies any needs and thanked this RN.          Intervention  Instructed in medication usage and effects. Medications administered as ordered. Patient encouraged to notify staff of any needs, increased/uncontrolled anxiety/depression, or thoughts to harm self or others.       Response  Patient is agreeable and verbalizes understanding.         Plan  Support offered and will continue to monitor. Q15 minute checks for safety.     "
"Behavior Pt is now resting in bed with eyes closed but aroused when name called. When asked how his day has been pt states \"pretty good.\" Pt is now alert and oriented to self only, attempted to reorient back to unit and date. At the beginning of the shift pt was up pacing and wandering the unit. Pt was also intrusive in others rooms and even took the foot board off another pt's bed. Pt is restless and trying to get in the staff break room. Pt can also be irritable at times. But now pt is calm, quiet, and keeping to self. Pt did not attend group. Pt thoughts are disorganized. Pt denies depression, anxiety, suicidal/homicidal ideations and hallucinations, \"not yet anyway,\" and none observed. Pt says he feels tired and helpless. When asked why he feels helpless pt states \"I don't have no car and I try to get out.\"            Intervention  Instructed in med usage and effects, compliant with all night meds. Encouraged pt to let staff know of any concerns or problems.        Response  Pt verbalized understanding.          Plan  Will continue to monitor for safety/  every 15 minute monitoring checks and redirect PRN.    "
Behavior   Anxiety -0  Depression -0  Pain -0  AVH denies  S/I denies  H/I denies    Patient has been ambulating in halls.  He will try to 'fix' things throughout day (ie, shower, doors that are locked, ect) but has not been agitation or irritable this shift.          Intervention  Instructed in med usage and effects, encouraged to verbalize needs. Meds administered as ordered.          Response  Verbalized understanding.  Requires reorientation           Plan  Continue to monitor for safety every 15 minute monitoring checks.    
Behavior   Anxiety 0  Depression 0  Pain 0  AVH no  S/I no  H/I no    PT IS SLEEPING. EARLIER HE WAS COMPLIANT WITH TAKING MEDS, BUT WAS UNABLE TO PARTICIPATE IN GROUP.        Intervention  Instructed in med usage and effects, encouraged to verbalize needs. Meds administered as ordered.          Response  Verbalized understanding           Plan  Continue to monitor for safety/  every 15 minute monitoring checks.      
Behavior   Anxiety 0  Depression 0  Pain 0  AVH no  S/I no  H/I no    PT IS SLEEPING. HE IS MED COMPLIANT. NO INCREASED BEHAVIORS NOTED.        Intervention  Instructed in med usage and effects, encouraged to verbalize needs. Meds administered as ordered.          Response  Verbalized understanding           Plan  Continue to monitor for safety/  every 15 minute monitoring checks.      
Behavior   Anxiety 0  Depression 0  Pain 0  AVH no  S/I no  H/I no    Pt alert and oriented to self only.  Pt appears irritated this morning.  Pt yelling at staff when trying to assist him with anything.  Pt attempting to open doors that are locked by pushing on the door several times, then cursing at staff when redirected.  Pt stated that he did sleep well last night.  Taking medications as prescribed.        Intervention  Instructed in med usage and effects, encouraged to verbalize needs. Meds administered as ordered.  Pt encouraged to attend groups and follow treatment plan.          Response  Pt walked away from this nurse without saying anything.          Plan  Continue to monitor for safety/  every 15 minute monitoring checks.  
Behavior   Anxiety 0  Depression 0  Pain 0  AVH no  S/I no  H/I no    Pt alert and oriented to self only.  Pt had been attempting to get through the locked doors on the unit by pulling on them. Pt easily redirected.  Pt stated that he slept well last night.  Pt taking medications as prescribed.        Intervention  Instructed in med usage and effects, encouraged to verbalize needs. Meds administered as ordered.  Pt encouraged to attend groups and follow treatment plan.          Response  Verbalized understanding           Plan  Continue to monitor for safety/  every 15 minute monitoring checks.  
Behavior   Anxiety 0  Depression 0  Pain 0  AVH no  S/I no  H/I no   Pt is irritable and swears a lot. Pt naps often. Pt is confused and needs to be reoriented often. Pt is generally cooperative. Pt denies all symptoms. Pt is oriented to self.            Intervention  Instructed in med usage and effects, encouraged to verbalize needs. Meds administered as ordered. Discussed with pt the need for rest.          Response  Verbalized understanding. Needs reinforcement.           Plan  Continue to monitor for safety/  every 15 minute monitoring checks. RN will reorient and educate as needed.    
Behavior   Anxiety 0  Depression 0  Pain 0  AVH no  S/I no  H/I no   Pt. Difficult to converse with due to confusion. Patient presents with demanding & argumentive demeanor. Ambulates with a shuffling slow gait & holds onto the handrail. Patient requires redirection & reorientation. Patient gets confused where his room is. Medication compliant.  Intervention  Instructed in med usage and effects, encouraged to verbalize needs. Meds administered as ordered.  Response  Verbalized understanding  Plan  Continue to monitor for safety/  every 15 minute monitoring checks.  
Behavior   Anxiety 0  Depression 0  Pain 0  AVH no  S/I no  H/I no  Pt has been calm and cooperative today. Pt got up and ate breakfast. Pt is now back in room resting. Pt is confused, but cooperative. Pt speaks and walks clearly.        Intervention  Instructed in med usage and effects, encouraged to verbalize needs. Meds administered as ordered. Discharge instructions prepared and instructions given.          Response  Verbalized understanding           Plan  Continue to monitor for safety/  every 15 minute monitoring checks. RN will arrange d/c.  
Behavior   Anxiety 0  Depression0  Pain 0  AVH denies  S/I denies  H/I denies    Patient has fair contact and flat affect.  He answers questions but will occasionally holler out.  He is alert to name only.      Intervention  Instructed in med usage and effects, encouraged to verbalize needs. Meds administered as ordered.  Patient offered activities and meds.  He requires redirection often.          Response  Verbalized understanding -reorientation when needed          Plan  Continue to monitor for safety every 15 minute monitoring checks.    
Behavior   Anxiety 5  Depression 0  Pain 0  AVH no  S/I no  H/I no  Patient has req. freq. redirection.  He was seen throwing water cup at peer across table.  He has tried to get out of exit doors.  He has went into peer's rooms.  He denies depression/anxiety but patient is obviously both.  He is argumentative with staff.  Argues with peers.  Is orient to name only.          Intervention  Instructed in med usage and effects, encouraged to verbalize needs. Meds administered as ordered.          Response  Verbalized understanding           Plan  Continue to monitor for safety/  every 15 minute monitoring checks.    
Patient admitted via stretcher from [facility] with security and staff to bed 668.  Patient alert to name.  Patient oriented to unit and room but needs reorientation.  Care plans to be initiated.  
Patient has been up and ambulating around halls on [facility].  Patient attempting to open doors that are locked.  He was also hitting on his window in his room and will continuously go into his bathroom and turn the bathroom call light on.  Patient states that he doesn't want anything but that he is just trying to fix things.  Redirection and reorientation when needed.    
Pt left via EMS on stretcher. EMS given trip sheet, D/C instructions, and personal belongings. Pt left in NAD. All D/C instructions given to Darshana TAYLOR at [facility], and patient. Pt left with scripts as well.  
Report given to Darshana andrade [place] at this time. Awaiting EMS for transfer.  
14